# Patient Record
Sex: MALE | Race: WHITE | NOT HISPANIC OR LATINO | Employment: FULL TIME | URBAN - METROPOLITAN AREA
[De-identification: names, ages, dates, MRNs, and addresses within clinical notes are randomized per-mention and may not be internally consistent; named-entity substitution may affect disease eponyms.]

---

## 2017-10-14 ENCOUNTER — APPOINTMENT (OUTPATIENT)
Dept: SOCIAL WORK | Facility: CLINIC | Age: 27
End: 2017-10-14
Payer: COMMERCIAL

## 2017-10-14 PROCEDURE — 90471 IMMUNIZATION ADMIN: CPT | Performed by: REGISTERED NURSE

## 2017-10-14 PROCEDURE — 90686 IIV4 VACC NO PRSV 0.5 ML IM: CPT | Performed by: REGISTERED NURSE

## 2018-02-15 ENCOUNTER — SLEEP CENTER VISIT (OUTPATIENT)
Dept: SLEEP MEDICINE | Facility: MEDICAL CENTER | Age: 28
End: 2018-02-15
Payer: COMMERCIAL

## 2018-02-15 VITALS
SYSTOLIC BLOOD PRESSURE: 132 MMHG | BODY MASS INDEX: 26.34 KG/M2 | TEMPERATURE: 97.8 F | HEIGHT: 70 IN | WEIGHT: 184 LBS | DIASTOLIC BLOOD PRESSURE: 90 MMHG | RESPIRATION RATE: 18 BRPM | HEART RATE: 74 BPM | OXYGEN SATURATION: 96 %

## 2018-02-15 DIAGNOSIS — R06.83 SNORING: ICD-10-CM

## 2018-02-15 DIAGNOSIS — G47.10 HYPERSOMNOLENCE: ICD-10-CM

## 2018-02-15 DIAGNOSIS — G47.30 SLEEP APNEA, UNSPECIFIED TYPE: ICD-10-CM

## 2018-02-15 PROCEDURE — 99244 OFF/OP CNSLTJ NEW/EST MOD 40: CPT | Performed by: INTERNAL MEDICINE

## 2018-02-21 NOTE — PROGRESS NOTES
CC:  Snoring, non-refreshing sleep and excessive daytime somnolence, suspected sleep apnea hypopnea syndrome.    HPI:   Mr. Reyes is a 28-year-old man referred by Dr. Cortes Hi to assist in the evaluation and management of suspected sleep-disordered breathing.    He remembers that he was told by an otolaryngologist at age 10 that he would probably develop sleep apnea as an adult.  For many years he's been told of his snoring and has experienced non-refreshing sleep.  He has a regular sleep schedule, as confirmed by the sleep diary with bedtime at about 10 PM to midnight but may take several hours to fall asleep.  He awakens 3-4 times on average night and arises at 6:30 in the morning on workdays but sleeps until 9 on weekends.  In the morning he often feels tired and groggy without regular morning headaches.  He lives alone and we don't have information on possible cyclic breathing or nocturnal apnea.  He is tired during the day and frequently dozes off while reading or watching television.  His [place] sleepiness score is elevated at 9 points.  He drinks caffeinated beverages moderately and does not use substances to maintain wakefulness.  He has not previously been evaluated for sleep issues.  He does not have symptoms suggesting narcolepsy, parasomnia or restless leg syndrome.  He notes that both of his parents undergone surgery for significant sleep breathing problems.      Past Medical History:   Diagnosis Date   • Back pain    • Bronchitis    • Chickenpox    • Cough    • Depression    • Pneumonia    • Shortness of breath    • Snoring    • Sore throat, chronic        History reviewed. No pertinent surgical history.    Family History   Problem Relation Age of Onset   • No Known Problems Mother    • Sleep Apnea Father      Severe DEJA, had UPPP   • No Known Problems Sister        Social History     Social History   • Marital status: Single     Spouse name: N/A   • Number of children: N/A   • Years of education:  "N/A     Occupational History   • Not on file.     Social History Main Topics   • Smoking status: Never Smoker   • Smokeless tobacco: Never Used      Comment: weekday 2-3 weekend 3-4    • Alcohol use Yes   • Drug use: Yes     Frequency: 7.0 times per week     Types: Marijuana   • Sexual activity: Not on file     Other Topics Concern   • Not on file     Social History Narrative   • No narrative on file       Current Outpatient Prescriptions   Medication Sig Dispense Refill   • Multiple Vitamins-Minerals (MENS MULTIVITAMIN PLUS PO) Take  by mouth.       No current facility-administered medications for this visit.     \"CURRENT RX\"      Allergies: Patient has no known allergies.      ROS  Positive for the sleep issues reviewed above.  He's had no diplopia or visual loss, he has had recurrent pharyngitis with cough but without hoarseness.  She's had no chest pain or palpitations, heartburn or abdominal discomfort.  All other aspects of the CPT review of systems process are negative as outlined in the attached self history form.      Physical Exam:   /90   Pulse 74   Temp 36.6 °C (97.8 °F)   Resp 18   Ht 1.778 m (5' 10\")   Wt 83.5 kg (184 lb)   SpO2 96%   BMI 26.40 kg/m²    Head and neck examination demonstrates no mucosal lesion, purulent drainage or evident polyps. The pharynx is benign with a Mallampati III presentation. The neck is supple without thyromegaly. On chest examination there are symmetrical bilateral breath sounds without rales, wheezing or consolidation. On cardiac examination, the apical impulse and heart sounds are normal and the rhythm is regular. There is no murmur, gallop or rub and no jugular venous distention. The abdomen is soft with active bowel sounds and no palpable hepatosplenomegaly, mass, guarding or rebound. The extremities show no clubbing, cyanosis or edema and no signs of deep venous thrombosis. There is no warmth, redness, tenderness or palpable venous cord in the calves. The " skin is clear, warm and dry. There is no unusual peripheral lymphadenopathy. Peripheral pulses are palpable in all 4 extremities. On neurologic examination, cranial nerve function is intact, motor tone is symmetrical, and the patient is alert, oriented and responsive.       Problems:  1. Sleep apnea, unspecified type  He presents with snoring, non-refreshing sleep and excessive daytime somnolence.  He has a crowded posterior pharyngeal airway.  The clinical probability of sleep apnea hypopnea syndrome is significant. Accurate diagnosis and effective treatment are required not only to improve levels of daytime alertness but also to reduce the cardiac and neurologic risks associated with untreated sleep apnea. We have discussed diagnostic options including in-laboratory, attended polysomnography and home sleep testing. We have also discussed treatment options including airway pressurization, reconstructive otolaryngologic surgery, dental appliances and weight management.    2. Hypersomnolence  Probably related at least in part to the sleep-disordered breathing.  His insomnia and relatively restricted nightly time in bed on week nights may also contribute to hypersomnolence.    3. Snoring      Plan:    1.  Sleep study.  In the interest of expediting his evaluation, we will begin with a home sleep test.  His insomnia may make off again interpretation of a home sleep test and I would suggest a formal polysomnogram if the home sleep test is negative or nondiagnostic given the high clinical probability of obstructive sleep apnea hypopnea syndrome.    2.  Return visit after testing to review test results and treatment options.    We appreciate the opportunity to assist in his care.

## 2018-02-26 ENCOUNTER — HOME STUDY (OUTPATIENT)
Dept: SLEEP MEDICINE | Facility: MEDICAL CENTER | Age: 28
End: 2018-02-26
Attending: INTERNAL MEDICINE
Payer: COMMERCIAL

## 2018-02-26 DIAGNOSIS — R06.83 SNORING: ICD-10-CM

## 2018-02-26 DIAGNOSIS — G47.10 HYPERSOMNOLENCE: ICD-10-CM

## 2018-02-26 DIAGNOSIS — G47.30 SLEEP APNEA, UNSPECIFIED TYPE: ICD-10-CM

## 2018-02-26 PROCEDURE — 95806 SLEEP STUDY UNATT&RESP EFFT: CPT | Performed by: FAMILY MEDICINE

## 2018-02-27 NOTE — PROCEDURES
DIAGNOSTIC HOME SLEEP TEST (HST) REPORT       PATIENT ID:  NAME:  Ryland Reyes  MRN:               [id number]  YOB: 1990  DATE OF STUDY: 2/26/18      Impression:     This study shows evidence of:     1. Moderat obstructive sleep apnea with  Respiratory Event Index (JENELLE) of 15.2 per hour and worse in supine sleep with JENELLE at 20/hr. These findings are based on the recording time (flow evaluation time). It is not possible with this device to determine a traditional apnea+hypopnea index (AHI) for total sleep time since EEG channels are not available.   O2 Sat. destiny was 79% and mean O2 sat was 93% and baseline O2 at 94 %. O2 sat was below 90% for 4% of the flow evaluation time. Oxygen Desaturation (>=3%) Index was elevated at 16/hr.       TECHNICAL DESCRIPTION:  [employer] Device used was a type-III home study device. Home sleep study recording included: Airflow recording by nasal pressure transducer; Respiratory Effort by abdominal Respiratory Bands; O2 by finger oximetry. A position sensor and a snore channel was also used.    Scoring Criteria: A modification of the the AASM Manual for the Scoring of Sleep and Associated Events, 2012, was used.   Obstructive apnea was scored by cessation of airflow for at least 10 seconds with continuing respiratory effort.  Central apnea was scored by cessation of airflow for at least 10 seconds with no effort.  Hypopnea was scored by a 30% or more reduction in airflow for at least 10 seconds accompanied by an arterial oxygen desaturation of 3% or more.  (For Medicare patients, hypopneas were scored by a 30% or more reduction in airflow for at least 10 seconds accompanied by an arterial oxygen saturation of 4% or more, as required by their insurance, CMS.        General sleep summary: . Total recording time is 8 hours and 51 minutes and total flow evaluation time is 7 hours and 26 minutes. The patient spent 4 hours and 39 minutes in the supine position and 2  hours and 28 minutes in the nonsupine position.    Respiratory events:    Apneas: 5 (Obstructive apnea index 0.3/hr, Central apnea index 0.4 /hr, mixed 0 /hour)  Hypopneas: 108    Recommendations:    1. CPAP titration study vs Auto CPAP trial.   2.   In general patients with sleep apnea are advised to avoid alcohol and sedatives and to not operate a motor vehicle while drowsy. In some cases alternative treatment options may prove effective in resolving sleep apnea in these options include upper airway surgery, the use of a dental orthotic or weight loss and positional therapy. Clinical correlation is required.         Please see the attached report for further details. Thank you for your referral.     Leda Cramer MD

## 2018-03-15 ENCOUNTER — SLEEP CENTER VISIT (OUTPATIENT)
Dept: SLEEP MEDICINE | Facility: MEDICAL CENTER | Age: 28
End: 2018-03-15
Payer: COMMERCIAL

## 2018-03-15 VITALS
OXYGEN SATURATION: 95 % | TEMPERATURE: 97 F | RESPIRATION RATE: 16 BRPM | HEART RATE: 92 BPM | WEIGHT: 190 LBS | BODY MASS INDEX: 27.2 KG/M2 | SYSTOLIC BLOOD PRESSURE: 124 MMHG | HEIGHT: 70 IN | DIASTOLIC BLOOD PRESSURE: 78 MMHG

## 2018-03-15 DIAGNOSIS — G47.33 OSA (OBSTRUCTIVE SLEEP APNEA): ICD-10-CM

## 2018-03-15 PROCEDURE — 99213 OFFICE O/P EST LOW 20 MIN: CPT | Performed by: FAMILY MEDICINE

## 2018-03-15 NOTE — PROGRESS NOTES
[facility] Follow Up Note     Date: 3/15/2018 / Time: 2:53 PM    Patient ID:   Name:             Ryland Reyes     YOB: 1990  Age:                 28 y.o.  male   MRN:               [id number]      Thank you for requesting a sleep medicine consultation on Ryland Reyes at the sleep center. He presents today with the chief complaints of DEJA and HST follow up.     HISTORY OF PRESENT ILLNESS:       Pt is currently not on CPAP. He goes to sleep around 10 pm and wakes up around 6-9 am. HST showed Moderat obstructive sleep apnea with  Respiratory Event Index (JENELLE) of 15.2 per hour and worse in supine sleep with JENELLE at 20/hr. O2 Sat. destiny was 79% and mean O2 sat was 93% and baseline O2 at 94 %. O2 sat was below 90% for 4% of the flow evaluation time. Oxygen Desaturation (>=3%) Index was elevated at 16/hr.         REVIEW OF SYSTEMS:       Constitutional: Denies fevers, Denies weight changes  Eyes: Denies changes in vision, no eye pain  Ears/Nose/Throat/Mouth: Denies nasal congestion or sore throat   Cardiovascular: Denies chest pain or palpitations   Respiratory: Denies shortness of breath , Denies cough  Gastrointestinal/Hepatic: Denies abdominal pain, nausea, vomiting, diarrhea, constipation or GI bleeding   Genitourinary: Denies bladder dysfunction, dysuria or frequency  Musculoskeletal/Rheum: Denies  joint pain and swelling   Skin/Breast: Denies rash,   Neurological: Denies headache, confusion, memory loss or focal weakness/parasthesias  Psychiatric: denies mood disorder     Comprehensive review of systems form is reviewed with the patient and is attached in the EMR.     PMH:  has a past medical history of Back pain; Bronchitis; Chickenpox; Cough; Depression; Pneumonia; Shortness of breath; Snoring; and Sore throat, chronic.  MEDS:   Current Outpatient Prescriptions:   •  Multiple Vitamins-Minerals (MENS MULTIVITAMIN PLUS PO), Take  by mouth., Disp: , Rfl:   ALLERGIES: No Known  "Allergies  SURGHX: History reviewed. No pertinent surgical history.  SOCHX:  reports that he has never smoked. He has never used smokeless tobacco. He reports that he drinks alcohol. He reports that he uses drugs, including Marijuana, about 7 times per week..  FH:   Family History   Problem Relation Age of Onset   • No Known Problems Mother    • Sleep Apnea Father      Severe DEJA, had UPPP   • No Known Problems Sister          Physical Exam:  Vitals/ General Appearance:   Weight/BMI: Body mass index is 27.26 kg/m².  Blood pressure 124/78, pulse 92, temperature 36.1 °C (97 °F), resp. rate 16, height 1.778 m (5' 10\"), weight 86.2 kg (190 lb), SpO2 95 %.  Vitals:    03/15/18 1450   BP: 124/78   Pulse: 92   Resp: 16   Temp: 36.1 °C (97 °F)   SpO2: 95%   Weight: 86.2 kg (190 lb)   Height: 1.778 m (5' 10\")       Pt. is alert and oriented to time, place and person. Cooperative and in no apparent distress.       1. Head: Atraumatic, normocephalic.   2. Ears: Normal tympanic membrane and no discharge  3. Nose: No inferior turbinate hypertophy, no septal deviation, no polyp.   4. Throat: Oropharynx appears crowded in that the palate is overhanging ([place] scale 3).   5. Neck: Supple. No thyromegaly  6. Chest: Trachea central, no spine deformity   7. Lungs auscultation: B/L good air entry, vesicular breath sounds, no adventitious sounds  8. Heart auscultation: 1st and 2nd heart sounds normal, regular rhythm. No appreciable murmur.  9. Abdomen: Soft, non tender, no organomegaly. Bowel sounds present  10. Extremities: no clubbing, no pedal edema.  11. Skin: No rash  12. NEUROLOGICAL EXAMINATION: On neurological exam, the patient was alert and oriented x3. speech was clear and fluent without dysarthria.      INVESTIGATIONS:           ASSESSMENT AND PLAN     1.Obstructive Sleep Apnea (DEJA).He  Is currently not on CPAP.       The pathophysiology of DEJA and the increased risk of cardiovascular morbidity from untreated DEJA is " discussed in detail with the patient.      He is urged to avoid supine sleep, weight gain and alcoholic beverages since all of these can worsen DEJA. He is cautioned against drowsy driving. If He feels sleepy while driving, He must pull over for a break/nap, rather than persist on the road, in the interest of He own safety and that of others on the road.   Plan   - Auto CPAP vs overnight CPAP titration vs OAT and surgical procedures. After informed discussion he  Would like to go for UPPP   - HST was reviewed and discussed with the pt   - recommended f/u SS after he has recovered from the surgery    2 Regarding treatment of other past medical problems and general health maintenance,  He is urged to follow up with PCP.

## 2018-06-06 DIAGNOSIS — Z01.812 PRE-OPERATIVE LABORATORY EXAMINATION: ICD-10-CM

## 2018-06-06 LAB
ANION GAP SERPL CALC-SCNC: 10 MMOL/L (ref 0–11.9)
BUN SERPL-MCNC: 23 MG/DL (ref 8–22)
CALCIUM SERPL-MCNC: 9.6 MG/DL (ref 8.5–10.5)
CHLORIDE SERPL-SCNC: 102 MMOL/L (ref 96–112)
CO2 SERPL-SCNC: 25 MMOL/L (ref 20–33)
CREAT SERPL-MCNC: 0.86 MG/DL (ref 0.5–1.4)
ERYTHROCYTE [DISTWIDTH] IN BLOOD BY AUTOMATED COUNT: 37.2 FL (ref 35.9–50)
GLUCOSE SERPL-MCNC: 88 MG/DL (ref 65–99)
HCT VFR BLD AUTO: 52.4 % (ref 42–52)
HGB BLD-MCNC: 18.4 G/DL (ref 14–18)
MCH RBC QN AUTO: 30.8 PG (ref 27–33)
MCHC RBC AUTO-ENTMCNC: 35.1 G/DL (ref 33.7–35.3)
MCV RBC AUTO: 87.6 FL (ref 81.4–97.8)
PLATELET # BLD AUTO: 185 K/UL (ref 164–446)
PMV BLD AUTO: 10.8 FL (ref 9–12.9)
POTASSIUM SERPL-SCNC: 4 MMOL/L (ref 3.6–5.5)
RBC # BLD AUTO: 5.98 M/UL (ref 4.7–6.1)
SODIUM SERPL-SCNC: 137 MMOL/L (ref 135–145)
WBC # BLD AUTO: 8.7 K/UL (ref 4.8–10.8)

## 2018-06-06 PROCEDURE — 85027 COMPLETE CBC AUTOMATED: CPT

## 2018-06-06 PROCEDURE — 80048 BASIC METABOLIC PNL TOTAL CA: CPT

## 2018-06-06 PROCEDURE — 36415 COLL VENOUS BLD VENIPUNCTURE: CPT

## 2018-06-21 ENCOUNTER — HOSPITAL ENCOUNTER (OUTPATIENT)
Facility: MEDICAL CENTER | Age: 28
End: 2018-06-22
Attending: SPECIALIST | Admitting: SPECIALIST
Payer: COMMERCIAL

## 2018-06-21 DIAGNOSIS — J35.1 TONSILLAR HYPERTROPHY: ICD-10-CM

## 2018-06-21 DIAGNOSIS — G89.18 ACUTE POSTOPERATIVE PAIN: ICD-10-CM

## 2018-06-21 DIAGNOSIS — J34.3 HYPERTROPHY OF BOTH INFERIOR NASAL TURBINATES: ICD-10-CM

## 2018-06-21 DIAGNOSIS — J34.2 NASAL SEPTAL DEVIATION: ICD-10-CM

## 2018-06-21 PROCEDURE — 700101 HCHG RX REV CODE 250

## 2018-06-21 PROCEDURE — A9270 NON-COVERED ITEM OR SERVICE: HCPCS | Performed by: SPECIALIST

## 2018-06-21 PROCEDURE — G0378 HOSPITAL OBSERVATION PER HR: HCPCS

## 2018-06-21 PROCEDURE — 501409 HCHG SPLINT, REUTER BI-VALVE NASAL: Performed by: SPECIALIST

## 2018-06-21 PROCEDURE — 110454 HCHG SHELL REV 250: Performed by: SPECIALIST

## 2018-06-21 PROCEDURE — 700105 HCHG RX REV CODE 258: Performed by: SPECIALIST

## 2018-06-21 PROCEDURE — 160002 HCHG RECOVERY MINUTES (STAT): Performed by: SPECIALIST

## 2018-06-21 PROCEDURE — 501838 HCHG SUTURE GENERAL: Performed by: SPECIALIST

## 2018-06-21 PROCEDURE — 700111 HCHG RX REV CODE 636 W/ 250 OVERRIDE (IP): Performed by: SPECIALIST

## 2018-06-21 PROCEDURE — 700111 HCHG RX REV CODE 636 W/ 250 OVERRIDE (IP)

## 2018-06-21 PROCEDURE — 160029 HCHG SURGERY MINUTES - 1ST 30 MINS LEVEL 4: Performed by: SPECIALIST

## 2018-06-21 PROCEDURE — 88304 TISSUE EXAM BY PATHOLOGIST: CPT

## 2018-06-21 PROCEDURE — 160041 HCHG SURGERY MINUTES - EA ADDL 1 MIN LEVEL 4: Performed by: SPECIALIST

## 2018-06-21 PROCEDURE — 500125 HCHG BOVIE, HANDLE: Performed by: SPECIALIST

## 2018-06-21 PROCEDURE — 500331 HCHG COTTONOID, SURG PATTIE: Performed by: SPECIALIST

## 2018-06-21 PROCEDURE — 160009 HCHG ANES TIME/MIN: Performed by: SPECIALIST

## 2018-06-21 PROCEDURE — 700102 HCHG RX REV CODE 250 W/ 637 OVERRIDE(OP)

## 2018-06-21 PROCEDURE — 502573 HCHG PACK, ENT: Performed by: SPECIALIST

## 2018-06-21 PROCEDURE — 700102 HCHG RX REV CODE 250 W/ 637 OVERRIDE(OP): Performed by: SPECIALIST

## 2018-06-21 PROCEDURE — 160048 HCHG OR STATISTICAL LEVEL 1-5: Performed by: SPECIALIST

## 2018-06-21 PROCEDURE — A9270 NON-COVERED ITEM OR SERVICE: HCPCS

## 2018-06-21 PROCEDURE — 501424 HCHG SPONGE, TONSIL: Performed by: SPECIALIST

## 2018-06-21 PROCEDURE — 160035 HCHG PACU - 1ST 60 MINS PHASE I: Performed by: SPECIALIST

## 2018-06-21 PROCEDURE — 96374 THER/PROPH/DIAG INJ IV PUSH: CPT

## 2018-06-21 RX ORDER — LIDOCAINE HYDROCHLORIDE AND EPINEPHRINE 10; 10 MG/ML; UG/ML
INJECTION, SOLUTION INFILTRATION; PERINEURAL
Status: DISCONTINUED | OUTPATIENT
Start: 2018-06-21 | End: 2018-06-21 | Stop reason: HOSPADM

## 2018-06-21 RX ORDER — OXYCODONE HCL 10 MG/1
TABLET, FILM COATED, EXTENDED RELEASE ORAL
Status: COMPLETED
Start: 2018-06-21 | End: 2018-06-21

## 2018-06-21 RX ORDER — OXYCODONE HCL 5 MG/5 ML
SOLUTION, ORAL ORAL
Status: COMPLETED
Start: 2018-06-21 | End: 2018-06-21

## 2018-06-21 RX ORDER — LIDOCAINE HYDROCHLORIDE AND EPINEPHRINE 10; 10 MG/ML; UG/ML
INJECTION, SOLUTION INFILTRATION; PERINEURAL
Status: COMPLETED
Start: 2018-06-21 | End: 2018-06-21

## 2018-06-21 RX ORDER — TIMOLOL MALEATE 5 MG/ML
SOLUTION/ DROPS OPHTHALMIC
Status: COMPLETED
Start: 2018-06-21 | End: 2018-06-21

## 2018-06-21 RX ORDER — SODIUM CHLORIDE, SODIUM LACTATE, POTASSIUM CHLORIDE, CALCIUM CHLORIDE 600; 310; 30; 20 MG/100ML; MG/100ML; MG/100ML; MG/100ML
INJECTION, SOLUTION INTRAVENOUS CONTINUOUS
Status: DISCONTINUED | OUTPATIENT
Start: 2018-06-21 | End: 2018-06-22 | Stop reason: HOSPADM

## 2018-06-21 RX ORDER — ONDANSETRON 2 MG/ML
4 INJECTION INTRAMUSCULAR; INTRAVENOUS EVERY 6 HOURS PRN
Status: DISCONTINUED | OUTPATIENT
Start: 2018-06-21 | End: 2018-06-22 | Stop reason: HOSPADM

## 2018-06-21 RX ORDER — GABAPENTIN 300 MG/1
CAPSULE ORAL
Status: COMPLETED
Start: 2018-06-21 | End: 2018-06-21

## 2018-06-21 RX ORDER — SCOLOPAMINE TRANSDERMAL SYSTEM 1 MG/1
PATCH, EXTENDED RELEASE TRANSDERMAL
Status: COMPLETED
Start: 2018-06-21 | End: 2018-06-21

## 2018-06-21 RX ORDER — ECHINACEA PURPUREA EXTRACT 125 MG
3-4 TABLET ORAL 4 TIMES DAILY
Status: DISCONTINUED | OUTPATIENT
Start: 2018-06-21 | End: 2018-06-22 | Stop reason: HOSPADM

## 2018-06-21 RX ORDER — BUPIVACAINE HYDROCHLORIDE AND EPINEPHRINE 2.5; 5 MG/ML; UG/ML
INJECTION, SOLUTION EPIDURAL; INFILTRATION; INTRACAUDAL; PERINEURAL
Status: DISCONTINUED | OUTPATIENT
Start: 2018-06-21 | End: 2018-06-21 | Stop reason: HOSPADM

## 2018-06-21 RX ORDER — MORPHINE SULFATE 10 MG/ML
5 INJECTION, SOLUTION INTRAMUSCULAR; INTRAVENOUS
Status: DISCONTINUED | OUTPATIENT
Start: 2018-06-21 | End: 2018-06-22 | Stop reason: HOSPADM

## 2018-06-21 RX ORDER — OXYMETAZOLINE HYDROCHLORIDE 0.05 G/100ML
SPRAY NASAL
Status: COMPLETED
Start: 2018-06-21 | End: 2018-06-21

## 2018-06-21 RX ORDER — EPINEPHRINE 1 MG/ML
INJECTION INTRAMUSCULAR; INTRAVENOUS; SUBCUTANEOUS
Status: COMPLETED
Start: 2018-06-21 | End: 2018-06-21

## 2018-06-21 RX ORDER — BUPIVACAINE HYDROCHLORIDE 2.5 MG/ML
INJECTION, SOLUTION EPIDURAL; INFILTRATION; INTRACAUDAL
Status: COMPLETED
Start: 2018-06-21 | End: 2018-06-21

## 2018-06-21 RX ORDER — ACETAMINOPHEN 500 MG
TABLET ORAL
Status: COMPLETED
Start: 2018-06-21 | End: 2018-06-21

## 2018-06-21 RX ORDER — BACITRACIN ZINC 500 [USP'U]/G
OINTMENT TOPICAL
Status: DISCONTINUED | OUTPATIENT
Start: 2018-06-21 | End: 2018-06-21 | Stop reason: HOSPADM

## 2018-06-21 RX ORDER — DEXTROSE, SODIUM CHLORIDE, SODIUM LACTATE, POTASSIUM CHLORIDE, AND CALCIUM CHLORIDE 5; .6; .31; .03; .02 G/100ML; G/100ML; G/100ML; G/100ML; G/100ML
INJECTION, SOLUTION INTRAVENOUS CONTINUOUS
Status: DISCONTINUED | OUTPATIENT
Start: 2018-06-21 | End: 2018-06-22 | Stop reason: HOSPADM

## 2018-06-21 RX ADMIN — OXYCODONE HYDROCHLORIDE 10 MG: 5 SOLUTION ORAL at 11:30

## 2018-06-21 RX ADMIN — HYDROCODONE BITARTRATE AND ACETAMINOPHEN 15 ML: 7.5; 325 SOLUTION ORAL at 15:22

## 2018-06-21 RX ADMIN — FENTANYL CITRATE 50 MCG: 50 INJECTION, SOLUTION INTRAMUSCULAR; INTRAVENOUS at 11:35

## 2018-06-21 RX ADMIN — GABAPENTIN 300 MG: 300 CAPSULE ORAL at 07:10

## 2018-06-21 RX ADMIN — MORPHINE SULFATE 3 MG: 10 INJECTION INTRAVENOUS at 17:28

## 2018-06-21 RX ADMIN — OXYMETAZOLINE HYDROCHLORIDE 2 SPRAY: 5 SPRAY NASAL at 07:15

## 2018-06-21 RX ADMIN — SODIUM CHLORIDE, SODIUM LACTATE, POTASSIUM CHLORIDE, CALCIUM CHLORIDE AND DEXTROSE MONOHYDRATE: 5; 600; 310; 30; 20 INJECTION, SOLUTION INTRAVENOUS at 15:35

## 2018-06-21 RX ADMIN — Medication 3 SPRAY: at 20:29

## 2018-06-21 RX ADMIN — ACETAMINOPHEN 1000 MG: 500 TABLET, FILM COATED ORAL at 07:10

## 2018-06-21 RX ADMIN — OXYCODONE HYDROCHLORIDE 10 MG: 10 TABLET, FILM COATED, EXTENDED RELEASE ORAL at 07:10

## 2018-06-21 RX ADMIN — HYDROCODONE BITARTRATE AND ACETAMINOPHEN 20 ML: 7.5; 325 SOLUTION ORAL at 20:29

## 2018-06-21 ASSESSMENT — LIFESTYLE VARIABLES
DOES PATIENT WANT TO STOP DRINKING: NO
EVER_SMOKED: NEVER
HAVE PEOPLE ANNOYED YOU BY CRITICIZING YOUR DRINKING: YES
CONSUMPTION TOTAL: POSITIVE
HAVE YOU EVER FELT YOU SHOULD CUT DOWN ON YOUR DRINKING: NO
HOW MANY TIMES IN THE PAST YEAR HAVE YOU HAD 5 OR MORE DRINKS IN A DAY: 5
ON A TYPICAL DAY WHEN YOU DRINK ALCOHOL HOW MANY DRINKS DO YOU HAVE: 3
AVERAGE NUMBER OF DAYS PER WEEK YOU HAVE A DRINK CONTAINING ALCOHOL: 3
EVER FELT BAD OR GUILTY ABOUT YOUR DRINKING: NO
TOTAL SCORE: 2
ALCOHOL_USE: YES
EVER HAD A DRINK FIRST THING IN THE MORNING TO STEADY YOUR NERVES TO GET RID OF A HANGOVER: YES

## 2018-06-21 ASSESSMENT — COPD QUESTIONNAIRES
HAVE YOU SMOKED AT LEAST 100 CIGARETTES IN YOUR ENTIRE LIFE: NO/DON'T KNOW
IN THE PAST 12 MONTHS DO YOU DO LESS THAN YOU USED TO BECAUSE OF YOUR BREATHING PROBLEMS: DISAGREE/UNSURE
DO YOU EVER COUGH UP ANY MUCUS OR PHLEGM?: NO/ONLY WITH OCCASIONAL COLDS OR INFECTIONS
DURING THE PAST 4 WEEKS HOW MUCH DID YOU FEEL SHORT OF BREATH: NONE/LITTLE OF THE TIME
COPD SCREENING SCORE: 0

## 2018-06-21 ASSESSMENT — PAIN SCALES - GENERAL
PAINLEVEL_OUTOF10: 4
PAINLEVEL_OUTOF10: 0
PAINLEVEL_OUTOF10: 6
PAINLEVEL_OUTOF10: 0
PAINLEVEL_OUTOF10: 7
PAINLEVEL_OUTOF10: 0
PAINLEVEL_OUTOF10: 4

## 2018-06-21 ASSESSMENT — PATIENT HEALTH QUESTIONNAIRE - PHQ9
SUM OF ALL RESPONSES TO PHQ9 QUESTIONS 1 AND 2: 0
2. FEELING DOWN, DEPRESSED, IRRITABLE, OR HOPELESS: NOT AT ALL
1. LITTLE INTEREST OR PLEASURE IN DOING THINGS: NOT AT ALL

## 2018-06-21 NOTE — OR NURSING
1111 Pt transferred to PACU. Report received from OR RN and anesthesia. Pt responds to commands. Appears to have no distress at this time. VS stable, respirations even and unlabored. No complaints of pain or nausea.    1130 Pt medicated for pain per MAR. Tolerating sips of water without complaints of nausea.     1245 Report given to [facility], RN. Waiting for room to be cleaned.     1315 Pt placed on transport. Mother at bedside.     1330 Handoff to CORA Gordillo.     1345 Pt on transport to [facility]. Pt has all belongings. Family updated.

## 2018-06-21 NOTE — OR SURGEON
Immediate Post OP Note    PreOp Diagnosis: DEJA, NSD, turbinate hypertrophy, tonsillar hypertrophy    PostOp Diagnosis: same, chronic tonsillitis    Procedure(s):  SEPTOPLASTY - Wound Class: Clean Contaminated  SMR turbinates, bilateral - Wound Class: Clean Contaminated  UVULOPHARYNGOPALATOPLASTY - Wound Class: Clean Contaminated    Surgeon(s):  Cortes Hi M.D.    Anesthesiologist/Type of Anesthesia:  Anesthesiologist: Kesha Lira M.D./General    Surgical Staff:  Circulator: Akila Welch R.N.  Relief Scrub: Stephanie Paiz  Scrub Person: Simone Walton    Specimens removed if any:  Tonsils, uvula    Estimated Blood Loss: 50 ml    Findings:     Complications: none        6/21/2018 11:16 AM Cortes Hi M.D.

## 2018-06-21 NOTE — OP REPORT
DATE OF SERVICE:  06/21/2018    SURGEON:  Cortes Hi MD    ASSISTANT:  None.    ANESTHESIA:  General given by endotracheal tube by Dr. Lira.    PREOPERATIVE DIAGNOSES:  Obstructive sleep apnea, septal deformity, turbinate   hypertrophy, and tonsillar hypertrophy.    POSTOPERATIVE DIAGNOSES:  Obstructive sleep apnea, septal deformity, turbinate   hypertrophy, tonsillar hypertrophy, and chronic tonsillitis.    NAME OF THE PROCEDURE:  Nasal septoplasty; uvulopalatopharyngoplasty with   associated tonsillectomy; bilateral submucous resection of inferior   turbinates.    INDICATIONS:  The patient is a 28-year-old man with a history of obstructive   sleep apnea documented by polysomnography.  Septal deformity, turbinate   hypertrophy, and tonsillar hypertrophy have been noted on examination.  He   presents now for surgical intervention on elective basis.    DESCRIPTION OF PROCEDURE:  Patient was brought in the operating room, and   placed in supine position on the operating room table.  General anesthesia is   induced and the patient is endotracheally intubated without difficulty.  Eyes   are protected with taping and table was turned 90 degrees.    Approximately 4 mL of 4% cocaine solution applied topically in cottonoid   pledgets to both sides of the nose.  Vibrissae were trimmed.  After initial   vasoconstriction, the cottonoid pledgets were removed.  Approximately 14 mL of   1% Xylocaine with 1:100,000 units of epinephrine solution were used to   infiltrate both sides of the nasal septum as well as the inferior turbinate   areas.  Cottonoid pledgets were then repositioned in the nose.    A shoulder roll was placed beneath the shoulders to extend the neck.  Head   drapes were placed about the head.  A McIvor mouth gag was introduced into the   oral cavity.    Upon opening the retractor, the patient was noted to have 2+ size cryptic   appearing tonsils with cryptic debris present bilaterally.  No cleft or    submucous cleft to the palate is noted.    Attention was first directed to the left tonsil where an intratonsillar pillar   incision was made followed by identification of the tonsillar capsule and   dissection along the capsule from the superior to inferior direction utilizing   a gold laser at a power setting of 14 hallman continuous.  Tonsils delivered by   dissection means alone.  Bleeding was controlled with spot cauterization,   bleeding points with the laser in a deep focus fashion both during dissection   as well as following removal of the tonsil.  Once bleeding was controlled, the   mouth gag was released for a minute and reopened.    Attention was then directed to the right tonsil.  In a similar fashion,   anterior tonsillar pillar incision was made followed by identification of   tonsillar capsule and dissection along the capsule from the superior to   inferior direction utilizing a gold laser at a power setting of 14 hallman   continuous.  Cryptic debris was noted to extrude from the tonsil during   dissection.  Tonsils delivered by dissection means alone.  Bleeding was   controlled by spot cauterization, bleeding points with the laser in a deep   focus fashion both during dissection as well as following removal of the   tonsil.    The mouth gag was released for a minute and reopened.  Attention was then   directed to the palate.  The uvula was excised in its base in a quadrilateral   fashion preserving the dorsal mucosa and tissue of this palate.  Following   excision with a laser, the ventral portion of the palate V shape defect was   then approximated using a 3-0 Vicryl in a horizontal mattress fashion.  A 3-0   Vicryl was then used in a vertical mattress fashion to approximate the   anterior and posterior tonsillar pillars along the superior aspect of the   tonsillar fossa bilaterally, in an obliquely oriented direction to widen the   nasopharyngeal port.  Once sutures were placed on either side, the  anterior   and posterior tonsillar pillars were then approximated along the mid fossa   areas on both sides again with a vertical mattress stitch.  Significant   bleeding in the pharynx was not apparent.  Approximately 4 mL of 0.25%   Marcaine solution with epinephrine was then used to infiltrate along the area   of the uvulopalatopharyngoplasty.  The mouth gag was released.    Attention was then directed to the nose.  The nasal septum was noted to have a   deflection to the left superiorly as well as posteriorly with a prominent   spur into the middle meatal area posteriorly on the left.  A left   hemitransfixion incision is made followed by elevation of anterior and   posterior tunnels in the submucoperichondrial and submucoperiosteal planes on   the left side of the septum.  Care was taken with elevation over the area of   the spur formation.  The bony cartilaginous junction was identified   inferiorly, but there was noted to be a calcification and bony change along   the superior aspect of the quadrilateral cartilage suggestive of prior trauma.    A posterior tunnel was then created on the right hand side.  Portions of the   vomeric bone contributing to the spur formation posteriorly were removed   using [place] and Abad forceps.  Portions of the thickened and   deflecting perpendicular plate of the ethmoid were also removed again using   the [place] forceps and Abad forceps.  An area of calcification   along the superior aspect of the quadrilateral cartilage was carefully divided   with a freer followed by elevation on the right hand aspect to allow for   removal of some of the thickened and calcified cartilage from this area.  A   dorsal strut of cartilage is preserved, however.  Portions of the vomeric bone   and perpendicular plate of the ethmoid bone were then placed in a bone    and flattened and straightened.  Portions of the crushed bone were   then repositioned into  the posterior septum and the area of the perpendicular   plate of the ethmoid.  The calcified cartilage from the superior aspect of the   quadrilateral cartilage was also crushed and being repositioned into the   superior septal area.  The septum is noted to fall more into the midline with   improved airway bilaterally.  The hemitransfixion incision was closed using a   running lock stitch of 4-0 chromic.  A 4-0 chromic was also used in a running   horizontal mattress type quilting fashion to repose mucosal flaps.    Attention then directed to the left inferior turbinate.  The anterior leading   edge was first cauterized and then incised.  Mucosa and submucosa were   elevated from the medial and lateral aspects of the anterior portion of the   inferior turbinate bone.  Small portions of the inferior turbinate bone were   then resected in a piecemeal fashion with Abad forceps.  The remaining   submucosal tissues intramurally cauterized with a suction cautery and the   remaining turbinate was then outfractured.    Attention was then directed to the right inferior turbinate.  In a similar   fashion, anterior leading edge of the inferior turbinate was first cauterized   and then incised.  Mucosa and submucosa were elevated from the medial and   lateral aspects of the anterior portion of the inferior turbinate bone.  Small   portions of the inferior turbinate bone were then resected in a piecemeal   fashion with Abad forceps.  The remaining submucosal tissue was   intramurally cauterized.  The remaining turbinate was outfractured.    Dominique bivalved intranasal splints were placed bilaterally along the nasal   septum and secured with 3-0 silk suture.  Surgiflo was applied to the inferior   meatus area in the region of the SMR turbinates.    Attention then directed to the pharynx.  Further significant bleeding in the   pharynx was not apparent.  Stomach contents were aspirated.  The mouth gag was   then removed.   The patient was subsequently awakened from anesthesia and   extubated without difficulty.  He was taken from the operating room to the   recovery area, awake, breathing on his own in stable condition.  There are no   apparent complications.  Blood loss was felt to be approximately 50 mL.       ____________________________________     MD LORA EPPS / VINH    DD:  06/21/2018 11:27:03  DT:  06/21/2018 11:58:48    D#:  [id number]  Job#:  [id number]

## 2018-06-22 VITALS
RESPIRATION RATE: 16 BRPM | WEIGHT: 184.08 LBS | DIASTOLIC BLOOD PRESSURE: 76 MMHG | SYSTOLIC BLOOD PRESSURE: 124 MMHG | HEIGHT: 70 IN | TEMPERATURE: 97.4 F | OXYGEN SATURATION: 94 % | HEART RATE: 85 BPM | BODY MASS INDEX: 26.35 KG/M2

## 2018-06-22 PROCEDURE — 700105 HCHG RX REV CODE 258: Performed by: SPECIALIST

## 2018-06-22 PROCEDURE — 700111 HCHG RX REV CODE 636 W/ 250 OVERRIDE (IP): Performed by: SPECIALIST

## 2018-06-22 PROCEDURE — G0378 HOSPITAL OBSERVATION PER HR: HCPCS

## 2018-06-22 PROCEDURE — 96376 TX/PRO/DX INJ SAME DRUG ADON: CPT

## 2018-06-22 PROCEDURE — A9270 NON-COVERED ITEM OR SERVICE: HCPCS | Performed by: SPECIALIST

## 2018-06-22 PROCEDURE — 700102 HCHG RX REV CODE 250 W/ 637 OVERRIDE(OP): Performed by: SPECIALIST

## 2018-06-22 RX ORDER — HYDROCODONE BITARTRATE AND ACETAMINOPHEN 2.5; 108 MG/5ML; MG/5ML
15 SOLUTION ORAL EVERY 6 HOURS PRN
Qty: 480 ML | Refills: 0 | Status: SHIPPED | OUTPATIENT
Start: 2018-06-22 | End: 2018-06-29

## 2018-06-22 RX ORDER — CEFDINIR 250 MG/5ML
250 POWDER, FOR SUSPENSION ORAL 2 TIMES DAILY
Qty: 50 ML | Refills: 0 | Status: SHIPPED | OUTPATIENT
Start: 2018-06-22 | End: 2018-06-27

## 2018-06-22 RX ADMIN — SODIUM CHLORIDE, SODIUM LACTATE, POTASSIUM CHLORIDE, CALCIUM CHLORIDE AND DEXTROSE MONOHYDRATE: 5; 600; 310; 30; 20 INJECTION, SOLUTION INTRAVENOUS at 03:34

## 2018-06-22 RX ADMIN — MORPHINE SULFATE 5 MG: 10 INJECTION INTRAVENOUS at 09:02

## 2018-06-22 RX ADMIN — HYDROCODONE BITARTRATE AND ACETAMINOPHEN 20 ML: 7.5; 325 SOLUTION ORAL at 01:32

## 2018-06-22 RX ADMIN — HYDROCODONE BITARTRATE AND ACETAMINOPHEN 20 ML: 7.5; 325 SOLUTION ORAL at 06:45

## 2018-06-22 RX ADMIN — Medication 4 SPRAY: at 01:59

## 2018-06-22 RX ADMIN — Medication 4 SPRAY: at 08:16

## 2018-06-22 ASSESSMENT — PAIN SCALES - GENERAL
PAINLEVEL_OUTOF10: 5
PAINLEVEL_OUTOF10: 5
PAINLEVEL_OUTOF10: 7

## 2018-06-22 NOTE — CARE PLAN
Problem: Safety  Goal: Will remain free from falls  Outcome: PROGRESSING AS EXPECTED  Pt remains free from fall at this time.  Pt educated on fall risk.  Pt demonstrates understanding by appropriate use of call light to call for assistance.  CLIP, hourly rounding in place.     Problem: Venous Thromboembolism (VTW)/Deep Vein Thrombosis (DVT) Prevention:  Goal: Patient will participate in Venous Thrombosis (VTE)/Deep Vein Thrombosis (DVT)Prevention Measures  Outcome: PROGRESSING AS EXPECTED   06/21/18 2100   Mechanical/VTE Prophylaxis   Mechanical Prophylaxis  SCDs, Sequential Compression Device   SCDs, Sequential Compression Device Refused   OTHER   Risk Assessment Score 2   VTE RISK Moderate

## 2018-06-22 NOTE — PROGRESS NOTES
Report received from PACU RN.  Patient arrived to floor via gurney.   Patient able to stand with standby assistance.    Educated on admission including: unit policies, welcome packet, white board, TV system, call light, call before fall, plan of care, how to report/escalate concerns, VS schedule,lab schedule, oral care expectations, ambulation expectations, and up to chair for all meals expectation if possible.    Call light and belongings within reach.  All questions answered.

## 2018-06-22 NOTE — PROGRESS NOTES
Pt requested morphine for pain at this time. Throat examined and no bleeding noted. Nares with old blood and bright red blood after saline nose spray. HOB is up and tolerated solid diet and liquids easily.

## 2018-06-22 NOTE — DISCHARGE INSTRUCTIONS
Discharge Instructions    Discharged to home by car with relative. Discharged via wheelchair, hospital escort: Yes.  Special equipment needed: Not Applicable    Be sure to schedule a follow-up appointment with your primary care doctor or any specialists as instructed.     Discharge Plan:   Influenza Vaccine Indication: Not indicated: Previously immunized this influenza season and > 8 years of age    I understand that a diet low in cholesterol, fat, and sodium is recommended for good health. Unless I have been given specific instructions below for another diet, I accept this instruction as my diet prescription.   Other diet: As tolerated    Special Instructions: None    · Is patient discharged on Warfarin / Coumadin?   No   Septoplasty, Care After  Refer to this sheet in the next few weeks. These instructions provide you with information about caring for yourself after your procedure. Your health care provider may also give you more specific instructions. Your treatment has been planned according to current medical practices, but problems sometimes occur. Call your health care provider if you have any problems or questions after your procedure.  WHAT TO EXPECT AFTER THE PROCEDURE  After your procedure, it is typical to have the following:  · Mild headache.  · Stuffy nose.  · Feeling of fullness in your ears.  · Bloody fluid coming from your nose.  HOME CARE INSTRUCTIONS  Medicines  · If you were prescribed an antibiotic medicine, finish it all even if you start to feel better.  · Take medicines only as directed by your health care provider.  · You may be directed to clean your nostrils with a saline nasal spray. This will help to clear the crusts and blood clots in your nose. The solution can be found as an over-the-counter solution or made at home as directed by your health care provider.  · You may need to take laxatives or stool softeners as directed by your health care provider.  What to Avoid  · Do not blow your  nose for 2 weeks after surgery or as directed by your health care provider.  · Do not lift anything heavier than 10 lb (4.5 kg) for 2 weeks or as directed by your health care provider.  · Avoid strenuous activities that can cause nosebleeds for 2 weeks. These include activities such as running or playing sports.  · Avoid very hot or steamy showers for several days after surgery or as directed by your health care provider.  Eating and Drinking  · Avoid eating hot and spicy foods for several days after surgery or as directed by your health care provider.  · Eat plenty of fiber to keep your stools soft, especially while you are taking pain medicine. This helps you to avoid straining, which can cause a nosebleed.  · Drink enough fluid to keep your urine clear or pale yellow.  General Instructions  · Raise (elevate) your head while you are lying down.  · Keep all follow-up visits as directed by your health care provider. This is important.  · If you have nasal splints, follow your health care provider's instructions about removal.  · If your nose was packed with gauze, follow your health care provider's instructions about removal.  SEEK MEDICAL CARE IF:  · You develop swelling or increased pain in your nose.  · You have yellowish-white fluid (pus) coming from your nose.  · You have severe diarrhea.  · You have ongoing (persistent) nausea.  · You cannot breathe through your nose.  · You have a fever.  SEEK IMMEDIATE MEDICAL CARE IF:  · You are short of breath.  · You feel dizzy or you faint.  · You have vision changes.  · You are bleeding heavily from your nose.  · You are vomiting.  · You have a severe headache or a stiff neck.     This information is not intended to replace advice given to you by your health care provider. Make sure you discuss any questions you have with your health care provider.     Document Released: 12/18/2006 Document Revised: 01/08/2016 Document Reviewed: 07/29/2015  STYLIGHT Patient  Education ©2016 [employer]      Tonsillectomy, Adult, Care After  Refer to this sheet in the next few weeks. These instructions provide you with information on caring for yourself after your procedure. Your health care provider may also give you more specific instructions. Your treatment has been planned according to current medical practices, but problems sometimes occur. Call your health care provider if you have any problems or questions after your procedure.  WHAT TO EXPECT AFTER THE PROCEDURE  After your procedure, it is typical to have the following:  · Your tongue will be numb, and your sense of taste will be reduced.  · Your swallowing will be difficult and painful.  · Your jaw may hurt or make a clicking noise when you yawn or chew.  · Liquids that you drink may leak out of your nose.  · Your voice may sound muffled.  · The area at the middle of the roof of your mouth (uvula) may be very swollen.  · You may have a constant cough and need to clear mucus and phlegm from your throat.  HOME CARE INSTRUCTIONS   · Get proper rest, keeping your head elevated at all times.  · Drink plenty of fluids. This reduces pain and hastens the healing process.  · Take medicines only as directed by your health care provider.  · Soft and cold foods, such as gelatin, sherbet, ice cream, frozen ice pops, and cold drinks, are usually the easiest to eat. Several days after surgery, you will be able to eat more solid food.  · Avoid mouthwashes and gargles.  · Avoid contact with people who have upper respiratory infections, such as colds and sore throats.  SEEK MEDICAL CARE IF:   · You have increasing pain that is not controlled with medicines.  · You have a fever.  · You have a rash.  · You feel light-headed or faint.  · You are unable to swallow even small amounts of liquid or saliva.  · Your urine is becoming very dark.  SEEK IMMEDIATE MEDICAL CARE IF:   · You have difficulty breathing.  · You experience side effects or  allergic reactions to medicines.  · You bleed bright red blood from your throat, or you vomit bright red blood.     This information is not intended to replace advice given to you by your health care provider. Make sure you discuss any questions you have with your health care provider.     Document Released: 10/18/2005 Document Revised: 05/03/2016 Document Reviewed: 07/15/2014  [employer] Interactive Patient Education ©2016 [employer]    Depression / Suicide Risk    As you are discharged from this [facility] facility, it is important to learn how to keep safe from harming yourself.    Recognize the warning signs:  · Abrupt changes in personality, positive or negative- including increase in energy   · Giving away possessions  · Change in eating patterns- significant weight changes-  positive or negative  · Change in sleeping patterns- unable to sleep or sleeping all the time   · Unwillingness or inability to communicate  · Depression  · Unusual sadness, discouragement and loneliness  · Talk of wanting to die  · Neglect of personal appearance   · Rebelliousness- reckless behavior  · Withdrawal from people/activities they love  · Confusion- inability to concentrate     If you or a loved one observes any of these behaviors or has concerns about self-harm, here's what you can do:  · Talk about it- your feelings and reasons for harming yourself  · Remove any means that you might use to hurt yourself (examples: pills, rope, extension cords, firearm)  · Get professional help from the community (Mental Health, Substance Abuse, psychological counseling)  · Do not be alone:Call your Safe Contact- someone whom you trust who will be there for you.  · Call your local CRISIS HOTLINE 734-5867 or 247-764-4504  · Call your local Children's Mobile Crisis Response Team [place] (509) 840-8868 or www.[employer]  · Call the toll free National Suicide Prevention Hotlines   · National Suicide Prevention Lifeline 493-289-VBTR  (3936)  · [facility] 800-SUICIDE (198-2226)

## 2018-06-22 NOTE — PROGRESS NOTES
Assumed care of Mr Reyes at 1900.    Pt is A&O x4.  Pain 7/10.  Pt medicated per MAR  Nausea denied  Tolerating Diet   Nasal dressing in place, small amount of sanguineous drainage.  Positive Urine output  Positive BM Void PTA   Positive Flatus  Up Self   SCD's refused  Bed in lowest position and locked.    ** Bed alarm not indicated per Deyanira Lazaro assessment.  CLIP, hourly rounding in place    Pt resting comfortably now.  Review plan of care with patient  Call light within reach  Hourly rounds in place  All needs met at this time

## 2018-09-24 ENCOUNTER — HOSPITAL ENCOUNTER (OUTPATIENT)
Dept: LAB | Facility: MEDICAL CENTER | Age: 28
End: 2018-09-24
Attending: NURSE PRACTITIONER
Payer: COMMERCIAL

## 2018-09-24 LAB
ALBUMIN SERPL BCP-MCNC: 4.4 G/DL (ref 3.2–4.9)
ALBUMIN/GLOB SERPL: 1.6 G/DL
ALP SERPL-CCNC: 70 U/L (ref 30–99)
ALT SERPL-CCNC: 25 U/L (ref 2–50)
ANION GAP SERPL CALC-SCNC: 6 MMOL/L (ref 0–11.9)
AST SERPL-CCNC: 20 U/L (ref 12–45)
BASOPHILS # BLD AUTO: 0.9 % (ref 0–1.8)
BASOPHILS # BLD: 0.06 K/UL (ref 0–0.12)
BILIRUB SERPL-MCNC: 0.5 MG/DL (ref 0.1–1.5)
BUN SERPL-MCNC: 18 MG/DL (ref 8–22)
CALCIUM SERPL-MCNC: 9.7 MG/DL (ref 8.5–10.5)
CHLORIDE SERPL-SCNC: 104 MMOL/L (ref 96–112)
CHOLEST SERPL-MCNC: 154 MG/DL (ref 100–199)
CO2 SERPL-SCNC: 29 MMOL/L (ref 20–33)
CREAT SERPL-MCNC: 1.19 MG/DL (ref 0.5–1.4)
EOSINOPHIL # BLD AUTO: 0.11 K/UL (ref 0–0.51)
EOSINOPHIL NFR BLD: 1.6 % (ref 0–6.9)
ERYTHROCYTE [DISTWIDTH] IN BLOOD BY AUTOMATED COUNT: 42.9 FL (ref 35.9–50)
FASTING STATUS PATIENT QL REPORTED: NORMAL
GLOBULIN SER CALC-MCNC: 2.8 G/DL (ref 1.9–3.5)
GLUCOSE SERPL-MCNC: 93 MG/DL (ref 65–99)
HCT VFR BLD AUTO: 56.6 % (ref 42–52)
HDLC SERPL-MCNC: 40 MG/DL
HGB BLD-MCNC: 19.1 G/DL (ref 14–18)
IMM GRANULOCYTES # BLD AUTO: 0.01 K/UL (ref 0–0.11)
IMM GRANULOCYTES NFR BLD AUTO: 0.1 % (ref 0–0.9)
LDLC SERPL CALC-MCNC: 102 MG/DL
LYMPHOCYTES # BLD AUTO: 2 K/UL (ref 1–4.8)
LYMPHOCYTES NFR BLD: 29.4 % (ref 22–41)
MCH RBC QN AUTO: 32.2 PG (ref 27–33)
MCHC RBC AUTO-ENTMCNC: 33.7 G/DL (ref 33.7–35.3)
MCV RBC AUTO: 95.4 FL (ref 81.4–97.8)
MONOCYTES # BLD AUTO: 0.52 K/UL (ref 0–0.85)
MONOCYTES NFR BLD AUTO: 7.6 % (ref 0–13.4)
NEUTROPHILS # BLD AUTO: 4.1 K/UL (ref 1.82–7.42)
NEUTROPHILS NFR BLD: 60.4 % (ref 44–72)
NRBC # BLD AUTO: 0 K/UL
NRBC BLD-RTO: 0 /100 WBC
PLATELET # BLD AUTO: 235 K/UL (ref 164–446)
PMV BLD AUTO: 11 FL (ref 9–12.9)
POTASSIUM SERPL-SCNC: 4.6 MMOL/L (ref 3.6–5.5)
PROT SERPL-MCNC: 7.2 G/DL (ref 6–8.2)
RBC # BLD AUTO: 5.93 M/UL (ref 4.7–6.1)
SODIUM SERPL-SCNC: 139 MMOL/L (ref 135–145)
T4 FREE SERPL-MCNC: 0.83 NG/DL (ref 0.53–1.43)
TRIGL SERPL-MCNC: 61 MG/DL (ref 0–149)
TSH SERPL DL<=0.005 MIU/L-ACNC: 2 UIU/ML (ref 0.38–5.33)
WBC # BLD AUTO: 6.8 K/UL (ref 4.8–10.8)

## 2018-09-24 PROCEDURE — 84403 ASSAY OF TOTAL TESTOSTERONE: CPT

## 2018-09-24 PROCEDURE — 80061 LIPID PANEL: CPT

## 2018-09-24 PROCEDURE — 36415 COLL VENOUS BLD VENIPUNCTURE: CPT

## 2018-09-24 PROCEDURE — 84439 ASSAY OF FREE THYROXINE: CPT

## 2018-09-24 PROCEDURE — 80053 COMPREHEN METABOLIC PANEL: CPT

## 2018-09-24 PROCEDURE — 84443 ASSAY THYROID STIM HORMONE: CPT

## 2018-09-24 PROCEDURE — 84270 ASSAY OF SEX HORMONE GLOBUL: CPT

## 2018-09-24 PROCEDURE — 85025 COMPLETE CBC W/AUTO DIFF WBC: CPT

## 2018-09-25 LAB
SHBG SERPL-SCNC: 21 NMOL/L (ref 11–80)
TESTOST FREE MFR SERPL: ABNORMAL % (ref 1.6–2.9)
TESTOST FREE SERPL-MCNC: ABNORMAL PG/ML (ref 47–244)
TESTOST SERPL-MCNC: >1500 NG/DL (ref 300–1080)

## 2018-10-20 ENCOUNTER — HOSPITAL ENCOUNTER (OUTPATIENT)
Dept: LAB | Facility: MEDICAL CENTER | Age: 28
End: 2018-10-20
Attending: NURSE PRACTITIONER
Payer: COMMERCIAL

## 2018-10-20 LAB
BASOPHILS # BLD AUTO: 0.6 % (ref 0–1.8)
BASOPHILS # BLD: 0.04 K/UL (ref 0–0.12)
EOSINOPHIL # BLD AUTO: 0.14 K/UL (ref 0–0.51)
EOSINOPHIL NFR BLD: 2.2 % (ref 0–6.9)
ERYTHROCYTE [DISTWIDTH] IN BLOOD BY AUTOMATED COUNT: 39.1 FL (ref 35.9–50)
FERRITIN SERPL-MCNC: 40.7 NG/ML (ref 22–322)
HCT VFR BLD AUTO: 49.9 % (ref 42–52)
HGB BLD-MCNC: 17.2 G/DL (ref 14–18)
IMM GRANULOCYTES # BLD AUTO: 0.01 K/UL (ref 0–0.11)
IMM GRANULOCYTES NFR BLD AUTO: 0.2 % (ref 0–0.9)
IRON SATN MFR SERPL: 18 % (ref 15–55)
IRON SERPL-MCNC: 77 UG/DL (ref 50–180)
LYMPHOCYTES # BLD AUTO: 2.02 K/UL (ref 1–4.8)
LYMPHOCYTES NFR BLD: 31.1 % (ref 22–41)
MCH RBC QN AUTO: 31.6 PG (ref 27–33)
MCHC RBC AUTO-ENTMCNC: 34.5 G/DL (ref 33.7–35.3)
MCV RBC AUTO: 91.6 FL (ref 81.4–97.8)
MONOCYTES # BLD AUTO: 0.42 K/UL (ref 0–0.85)
MONOCYTES NFR BLD AUTO: 6.5 % (ref 0–13.4)
NEUTROPHILS # BLD AUTO: 3.87 K/UL (ref 1.82–7.42)
NEUTROPHILS NFR BLD: 59.4 % (ref 44–72)
NRBC # BLD AUTO: 0 K/UL
NRBC BLD-RTO: 0 /100 WBC
PLATELET # BLD AUTO: 206 K/UL (ref 164–446)
PMV BLD AUTO: 11.2 FL (ref 9–12.9)
RBC # BLD AUTO: 5.45 M/UL (ref 4.7–6.1)
TIBC SERPL-MCNC: 421 UG/DL (ref 250–450)
WBC # BLD AUTO: 6.5 K/UL (ref 4.8–10.8)

## 2018-10-20 PROCEDURE — 36415 COLL VENOUS BLD VENIPUNCTURE: CPT

## 2018-10-20 PROCEDURE — 83540 ASSAY OF IRON: CPT

## 2018-10-20 PROCEDURE — 85025 COMPLETE CBC W/AUTO DIFF WBC: CPT

## 2018-10-20 PROCEDURE — 82728 ASSAY OF FERRITIN: CPT

## 2018-10-20 PROCEDURE — 83550 IRON BINDING TEST: CPT

## 2018-10-25 ENCOUNTER — IMMUNIZATION (OUTPATIENT)
Dept: SOCIAL WORK | Facility: CLINIC | Age: 28
End: 2018-10-25
Payer: COMMERCIAL

## 2018-10-25 DIAGNOSIS — Z23 NEED FOR VACCINATION: ICD-10-CM

## 2018-10-25 PROCEDURE — 90471 IMMUNIZATION ADMIN: CPT | Performed by: REGISTERED NURSE

## 2018-10-25 PROCEDURE — 90686 IIV4 VACC NO PRSV 0.5 ML IM: CPT | Performed by: REGISTERED NURSE

## 2019-03-05 ENCOUNTER — OFFICE VISIT (OUTPATIENT)
Dept: ENDOCRINOLOGY | Facility: MEDICAL CENTER | Age: 29
End: 2019-03-05
Payer: COMMERCIAL

## 2019-03-05 VITALS
OXYGEN SATURATION: 94 % | HEIGHT: 70 IN | SYSTOLIC BLOOD PRESSURE: 124 MMHG | DIASTOLIC BLOOD PRESSURE: 82 MMHG | WEIGHT: 195 LBS | BODY MASS INDEX: 27.92 KG/M2 | HEART RATE: 100 BPM

## 2019-03-05 DIAGNOSIS — E34.9 TESTOSTERONE DEFICIENCY: ICD-10-CM

## 2019-03-05 DIAGNOSIS — G47.33 OSA (OBSTRUCTIVE SLEEP APNEA): ICD-10-CM

## 2019-03-05 DIAGNOSIS — D75.1 POLYCYTHEMIA: Primary | ICD-10-CM

## 2019-03-05 PROCEDURE — 99245 OFF/OP CONSLTJ NEW/EST HI 55: CPT | Performed by: INTERNAL MEDICINE

## 2019-03-05 RX ORDER — BUSPIRONE HYDROCHLORIDE 10 MG/1
TABLET ORAL
COMMUNITY
Start: 2019-02-05

## 2019-03-05 NOTE — PROGRESS NOTES
Chief Complaint   Patient presents with   • Hypogonadism      ?  Testosterone deficiency ?            CHIEF COMPLAINT:      Endocrine evaluation requested by MASHA Lofton for this 29 year old gentleman who has questions about testosterone supplementation.     HPI  This is an interesting situation.  He is a single, very athletic young man who maintains good physical condition and visits the gym regularly.  He is not a compulsive .  He has been an athlete in the past and had tryouts for the minor leagues in baseball but decided that was not his future.  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  He does not have a steady girlfriend or significant other.  He does enjoy his work and does see a future in that particular field.     However over the past year, he just noticed that he lacked interest in what he was doing.  He perhaps had a diminished libido.  He did not engage his gym workouts with the same enthusiasm that he ordinarily would.  There was an acquaintance of him, probably through his gym workout that was using testosterone.  Through that source he was able to get testosterone and give himself testosterone injections about twice a week.  He did that for about a month and felt exceptionally better.  He was quite pleased with the outcome.  However he does have sleep apnea and had sleep apnea surgery on his nasopharynx last September.  He had to discontinue the testosterone to have that accomplished.  Around that time, he consulted Whitley who did a testosterone level and found it greater than 1500.  Also she found polycythemia.  September 24, hemoglobin was 19.1 and hematocrit 56.  He was advised to stop using the testosterone and he did a phlebotomy.  Following his nasopharyngeal surgery he was feeling decidedly better but still in sort of a slump as before.      His growth and development in his early years was normal although he was somewhat of a late shar.  Went  into high school small but got out of high school having gone through puberty very well developed.  At no time did he develop gynecomastia or tender breasts before using testosterone or even during.      Now the question is does he have a testosterone problem or not and might he benefit from taking a testosterone supplement.  He has had no testosterone for at least three months.      The plan is to get a new baseline testosterone, free testosterone level, LH, prolactin, growth hormone and we will also repeat his CBC to make sure he is not getting polycythemic again.    I will speak with him again after we see our new baseline levels.        ROS:  Constitutional   feels generally healthy  Eyes   vision stable  ENT    no pain or epistaxis, no unusual congestion  Cardiac   no angina, no arrhythmia  Respiratory   no hemoptysis, no unusual dyspnea, no cough  GI    no pain, indigestion, or unexpected bowel change     no voiding symptoms  Musculoskeletal    no unusual or new pains  Skin   no suspicious or concerning lesions  Neuro  no unusual weakness or loss of function  Psych   appears alert and appropriate  Lymph   no new or unusual lumps or nodules      Allergies: No Known Allergies    Current medicines including changes today:  Current Outpatient Prescriptions   Medication Sig Dispense Refill   • busPIRone (BUSPAR) 10 MG Tab tablet        No current facility-administered medications for this visit.         Past Medical History:   Diagnosis Date   • Bronchitis 2017   • Cough    • Pneumonia 2008   • Shortness of breath    • Sleep apnea    • Snoring      Family history            No family history of hormonal problems.  Parents are healthy as is a sister.      Social history           Patient is single.  He works as a  in the [employer] industry.           He does not smoke cigarettes.  He drinks alcohol mildly 1 or 2 drinks in an evening home.  He does not frequent bars.  He uses marijuana for sleep    PHYSICAL  "EXAM:    /82 (BP Location: Right arm, Patient Position: Sitting)   Pulse 100   Ht 1.778 m (5' 10\")   Wt 88.5 kg (195 lb)   SpO2 94%   BMI 27.98 kg/m²     Gen.   appears healthy very well-developed, well-nourished and well muscled    Skin   appropriate for sex and age    HEENT  unremarkable    Neck   no adenopathy, thyroid gland is small palpable    Breasts   normal male.  Well-developed pectoral muscles.  No gynecomastia    Heart  regular    Genitalia    normal phallus and testicles.  Right testicle larger than left but both are normal consistency and configuration    Extremities  no edema    Neuro  gait and station normal    Psych  appropriate, calm, pleasant    ASSESSMENT AND RECOMMENDATIONS    1. Testosterone deficiency ?            Positive overall sense of well-being response to testosterone supplementation without laboratory monitoring.  Blood testosterone levels were excessive at one-point.            Off testosterone for at least 3 months we will now establish a new baseline    - TESTOSTERONE,FREE ADULT MALE; Future  - ESTRADIOL; Future  - LUTEINIZING HORMONE SERUM; Future  - PROLACTIN; Future  - IGF-1 SOMATOMEDIN; Future    2. Polycythemia             Probably high-dose testosterone plus sleep apnea  - CBC WITHOUT DIFFERENTIAL; Future    3. DEJA (obstructive sleep apnea)             Presumably corrected with nasopharyngeal surgery      DISPOSITION: Discuss lab results by telephone.  Further testing based on      Luke Salinas M.D.    Copies to: Cortes Gomes M.D. [phone]  "

## 2019-03-16 ENCOUNTER — HOSPITAL ENCOUNTER (OUTPATIENT)
Dept: LAB | Facility: MEDICAL CENTER | Age: 29
End: 2019-03-16
Attending: INTERNAL MEDICINE
Payer: COMMERCIAL

## 2019-03-16 DIAGNOSIS — D75.1 POLYCYTHEMIA: ICD-10-CM

## 2019-03-16 DIAGNOSIS — E34.9 TESTOSTERONE DEFICIENCY: ICD-10-CM

## 2019-03-16 LAB
ERYTHROCYTE [DISTWIDTH] IN BLOOD BY AUTOMATED COUNT: 40.3 FL (ref 35.9–50)
ESTRADIOL SERPL-MCNC: 24 PG/ML
HCT VFR BLD AUTO: 50.8 % (ref 42–52)
HGB BLD-MCNC: 17.2 G/DL (ref 14–18)
LH SERPL-ACNC: 2 IU/L (ref 1.7–8.6)
MCH RBC QN AUTO: 31.7 PG (ref 27–33)
MCHC RBC AUTO-ENTMCNC: 33.9 G/DL (ref 33.7–35.3)
MCV RBC AUTO: 93.6 FL (ref 81.4–97.8)
PLATELET # BLD AUTO: 210 K/UL (ref 164–446)
PMV BLD AUTO: 10.6 FL (ref 9–12.9)
PROLACTIN SERPL-MCNC: 4.45 NG/ML (ref 2.1–17.7)
RBC # BLD AUTO: 5.43 M/UL (ref 4.7–6.1)
WBC # BLD AUTO: 5.1 K/UL (ref 4.8–10.8)

## 2019-03-16 PROCEDURE — 83002 ASSAY OF GONADOTROPIN (LH): CPT

## 2019-03-16 PROCEDURE — 84402 ASSAY OF FREE TESTOSTERONE: CPT

## 2019-03-16 PROCEDURE — 84305 ASSAY OF SOMATOMEDIN: CPT

## 2019-03-16 PROCEDURE — 84146 ASSAY OF PROLACTIN: CPT

## 2019-03-16 PROCEDURE — 36415 COLL VENOUS BLD VENIPUNCTURE: CPT

## 2019-03-16 PROCEDURE — 82670 ASSAY OF TOTAL ESTRADIOL: CPT

## 2019-03-16 PROCEDURE — 85027 COMPLETE CBC AUTOMATED: CPT

## 2019-03-18 LAB
IGF-I SERPL-MCNC: 164 NG/ML (ref 84–250)
IGF-I Z-SCORE SERPL: 0.1
TESTOST FREE SERPL-MCNC: 72 PG/ML (ref 47–244)

## 2019-03-22 ENCOUNTER — TELEPHONE (OUTPATIENT)
Dept: ENDOCRINOLOGY | Facility: MEDICAL CENTER | Age: 29
End: 2019-03-22

## 2019-03-22 NOTE — TELEPHONE ENCOUNTER
1. Caller Name:Ryland Reyes                                        Call Back Number: [phone]      Patient approves a detailed voicemail message: N\A    2. Patient is requesting lab results dated: 3/16/19    3. Confirmed results are in chart. Patient advised they will be contacted once interpreted by provider.

## 2019-03-22 NOTE — TELEPHONE ENCOUNTER
Alex called stating that he just missed Dr. Salinas's phone call. Patient would like a return phone call please.

## 2019-03-26 ENCOUNTER — TELEPHONE (OUTPATIENT)
Dept: ENDOCRINOLOGY | Facility: MEDICAL CENTER | Age: 29
End: 2019-03-26

## 2019-03-26 DIAGNOSIS — E23.0 HYPOGONADOTROPIC HYPOGONADISM IN MALE (HCC): ICD-10-CM

## 2019-03-27 NOTE — TELEPHONE ENCOUNTER
Telephone conversation with patient    Laboratory data reviewed.  CBC is stable showing no polycythemia    Free testosterone is low normal at 72 ().  LH a bit on the low side also 2.0 (1.7-9.6) this might suggest a pituitary or hypothalamic problem.  Prolactin is normal at 4.4 and IGF 1 also normal at 164.    He very definitely wants to boost his testosterone level.  When he was using testosterone he was another person and very impressed with the change.  I discussed testosterone supplementation and possible hypothalamic pituitary suppression which might have a carryover effect on fertility in later years.  He definitely wants to maintain his fertility because eventually he sees himself  and with a family.  This brings up the possibility of using Clomid.  This is commonly used these days but not FDA approved.  I do not know how good the long-term studies and safety profile for Clomid is    If we are going to consider Clomid we will have to do an MRI of his pituitary to make sure there is not a tumor.  That will be a contraindication.    I will do a pituitary MRI.  Assuming that is normal I might have a discussion with 1 of the fertility urology specialists or get a consultation.  He is in agreement.    Luke Salinas M.D.

## 2019-04-03 ENCOUNTER — HOSPITAL ENCOUNTER (OUTPATIENT)
Dept: RADIOLOGY | Facility: MEDICAL CENTER | Age: 29
End: 2019-04-03
Attending: INTERNAL MEDICINE
Payer: COMMERCIAL

## 2019-04-03 DIAGNOSIS — E23.0 HYPOGONADOTROPIC HYPOGONADISM IN MALE (HCC): ICD-10-CM

## 2019-04-03 PROCEDURE — 700117 HCHG RX CONTRAST REV CODE 255: Performed by: INTERNAL MEDICINE

## 2019-04-03 PROCEDURE — A9585 GADOBUTROL INJECTION: HCPCS | Performed by: INTERNAL MEDICINE

## 2019-04-03 PROCEDURE — 70553 MRI BRAIN STEM W/O & W/DYE: CPT

## 2019-04-03 RX ORDER — GADOBUTROL 604.72 MG/ML
10 INJECTION INTRAVENOUS ONCE
Status: COMPLETED | OUTPATIENT
Start: 2019-04-03 | End: 2019-04-03

## 2019-04-03 RX ADMIN — GADOBUTROL 10 ML: 604.72 INJECTION INTRAVENOUS at 15:45

## 2019-04-07 ENCOUNTER — TELEPHONE (OUTPATIENT)
Dept: ENDOCRINOLOGY | Facility: MEDICAL CENTER | Age: 29
End: 2019-04-07

## 2019-04-07 DIAGNOSIS — E23.0 HYPOGONADOTROPIC HYPOGONADISM SYNDROME, MALE (HCC): ICD-10-CM

## 2019-04-07 NOTE — TELEPHONE ENCOUNTER
Telephone conversation with patient    Pituitary MRI is discussed.  Measurement slightly small but not more than 2 standard deviations below the norm so I consider this not clinically significant and not a cause of his low LH.  Low LH might still stem from his previous use of testosterone even though it has been over 3 months since he has used.  Or the low LH may be be the cause of his relatively low testosterone production.    He would like to boost his testosterone level because when he did he got a very definite positive response.  However he wants to maintain fertility.  I am going to refer him to Dr. John Diggs who is expert in these matters for an opinion.  I am unclear on the potential consequences of long-term clomiphene for example.    Luke Salinas M.D.

## 2019-05-01 ENCOUNTER — HOSPITAL ENCOUNTER (OUTPATIENT)
Dept: LAB | Facility: MEDICAL CENTER | Age: 29
End: 2019-05-01
Attending: UROLOGY
Payer: COMMERCIAL

## 2019-05-01 LAB
FSH SERPL-ACNC: 4.7 MIU/ML (ref 1.5–12.4)
HCT VFR BLD AUTO: 53.4 % (ref 42–52)
LH SERPL-ACNC: 2 IU/L (ref 1.7–8.6)
TSH SERPL DL<=0.005 MIU/L-ACNC: 1.26 UIU/ML (ref 0.38–5.33)

## 2019-05-01 PROCEDURE — 83002 ASSAY OF GONADOTROPIN (LH): CPT

## 2019-05-01 PROCEDURE — 84270 ASSAY OF SEX HORMONE GLOBUL: CPT

## 2019-05-01 PROCEDURE — 84403 ASSAY OF TOTAL TESTOSTERONE: CPT

## 2019-05-01 PROCEDURE — 84443 ASSAY THYROID STIM HORMONE: CPT

## 2019-05-01 PROCEDURE — 85014 HEMATOCRIT: CPT

## 2019-05-01 PROCEDURE — 36415 COLL VENOUS BLD VENIPUNCTURE: CPT

## 2019-05-01 PROCEDURE — 83001 ASSAY OF GONADOTROPIN (FSH): CPT

## 2019-05-03 LAB
SHBG SERPL-SCNC: 47 NMOL/L (ref 11–80)
TESTOST FREE MFR SERPL: 1.6 % (ref 1.6–2.9)
TESTOST FREE SERPL-MCNC: 84 PG/ML (ref 47–244)
TESTOST SERPL-MCNC: 542 NG/DL (ref 300–1080)

## 2019-06-25 ENCOUNTER — HOSPITAL ENCOUNTER (OUTPATIENT)
Dept: LAB | Facility: MEDICAL CENTER | Age: 29
End: 2019-06-25
Attending: FAMILY MEDICINE
Payer: COMMERCIAL

## 2019-06-25 LAB
ALBUMIN SERPL BCP-MCNC: 4.5 G/DL (ref 3.2–4.9)
ALBUMIN/GLOB SERPL: 1.7 G/DL
ALP SERPL-CCNC: 80 U/L (ref 30–99)
ALT SERPL-CCNC: 16 U/L (ref 2–50)
ANION GAP SERPL CALC-SCNC: 8 MMOL/L (ref 0–11.9)
APPEARANCE UR: CLEAR
AST SERPL-CCNC: 12 U/L (ref 12–45)
BASOPHILS # BLD AUTO: 0.7 % (ref 0–1.8)
BASOPHILS # BLD: 0.05 K/UL (ref 0–0.12)
BILIRUB SERPL-MCNC: 0.7 MG/DL (ref 0.1–1.5)
BILIRUB UR QL STRIP.AUTO: NEGATIVE
BUN SERPL-MCNC: 17 MG/DL (ref 8–22)
C TRACH DNA SPEC QL NAA+PROBE: NEGATIVE
CALCIUM SERPL-MCNC: 9.7 MG/DL (ref 8.5–10.5)
CHLORIDE SERPL-SCNC: 102 MMOL/L (ref 96–112)
CHOLEST SERPL-MCNC: 195 MG/DL (ref 100–199)
CK SERPL-CCNC: 54 U/L (ref 0–154)
CO2 SERPL-SCNC: 27 MMOL/L (ref 20–33)
COLOR UR: YELLOW
CREAT SERPL-MCNC: 1 MG/DL (ref 0.5–1.4)
CRP SERPL HS-MCNC: 0.19 MG/DL (ref 0–0.75)
EOSINOPHIL # BLD AUTO: 0.11 K/UL (ref 0–0.51)
EOSINOPHIL NFR BLD: 1.5 % (ref 0–6.9)
ERYTHROCYTE [DISTWIDTH] IN BLOOD BY AUTOMATED COUNT: 41.4 FL (ref 35.9–50)
GLOBULIN SER CALC-MCNC: 2.6 G/DL (ref 1.9–3.5)
GLUCOSE SERPL-MCNC: 100 MG/DL (ref 65–99)
GLUCOSE UR STRIP.AUTO-MCNC: NEGATIVE MG/DL
HCT VFR BLD AUTO: 51.4 % (ref 42–52)
HCV AB SER QL: NEGATIVE
HDLC SERPL-MCNC: 49 MG/DL
HGB BLD-MCNC: 16.9 G/DL (ref 14–18)
HIV 1+2 AB+HIV1 P24 AG SERPL QL IA: NON REACTIVE
IMM GRANULOCYTES # BLD AUTO: 0.02 K/UL (ref 0–0.11)
IMM GRANULOCYTES NFR BLD AUTO: 0.3 % (ref 0–0.9)
KETONES UR STRIP.AUTO-MCNC: NEGATIVE MG/DL
LDLC SERPL CALC-MCNC: 128 MG/DL
LEUKOCYTE ESTERASE UR QL STRIP.AUTO: NEGATIVE
LYMPHOCYTES # BLD AUTO: 2.16 K/UL (ref 1–4.8)
LYMPHOCYTES NFR BLD: 30.4 % (ref 22–41)
MCH RBC QN AUTO: 30.5 PG (ref 27–33)
MCHC RBC AUTO-ENTMCNC: 32.9 G/DL (ref 33.7–35.3)
MCV RBC AUTO: 92.8 FL (ref 81.4–97.8)
MICRO URNS: NORMAL
MONOCYTES # BLD AUTO: 0.37 K/UL (ref 0–0.85)
MONOCYTES NFR BLD AUTO: 5.2 % (ref 0–13.4)
N GONORRHOEA DNA SPEC QL NAA+PROBE: NEGATIVE
NEUTROPHILS # BLD AUTO: 4.4 K/UL (ref 1.82–7.42)
NEUTROPHILS NFR BLD: 61.9 % (ref 44–72)
NITRITE UR QL STRIP.AUTO: NEGATIVE
NRBC # BLD AUTO: 0 K/UL
NRBC BLD-RTO: 0 /100 WBC
PH UR STRIP.AUTO: 5.5 [PH]
PLATELET # BLD AUTO: 231 K/UL (ref 164–446)
PMV BLD AUTO: 10.7 FL (ref 9–12.9)
POTASSIUM SERPL-SCNC: 4 MMOL/L (ref 3.6–5.5)
PROT SERPL-MCNC: 7.1 G/DL (ref 6–8.2)
PROT UR QL STRIP: NEGATIVE MG/DL
RBC # BLD AUTO: 5.54 M/UL (ref 4.7–6.1)
RBC UR QL AUTO: NEGATIVE
SODIUM SERPL-SCNC: 137 MMOL/L (ref 135–145)
SP GR UR STRIP.AUTO: 1.02
SPECIMEN SOURCE: NORMAL
TREPONEMA PALLIDUM IGG+IGM AB [PRESENCE] IN SERUM OR PLASMA BY IMMUNOASSAY: NON REACTIVE
TRIGL SERPL-MCNC: 91 MG/DL (ref 0–149)
TSH SERPL DL<=0.005 MIU/L-ACNC: 1.15 UIU/ML (ref 0.38–5.33)
UROBILINOGEN UR STRIP.AUTO-MCNC: 0.2 MG/DL
WBC # BLD AUTO: 7.1 K/UL (ref 4.8–10.8)

## 2019-06-25 PROCEDURE — 86803 HEPATITIS C AB TEST: CPT

## 2019-06-25 PROCEDURE — 80061 LIPID PANEL: CPT

## 2019-06-25 PROCEDURE — 86780 TREPONEMA PALLIDUM: CPT

## 2019-06-25 PROCEDURE — 82550 ASSAY OF CK (CPK): CPT

## 2019-06-25 PROCEDURE — 80053 COMPREHEN METABOLIC PANEL: CPT

## 2019-06-25 PROCEDURE — 36415 COLL VENOUS BLD VENIPUNCTURE: CPT

## 2019-06-25 PROCEDURE — 85025 COMPLETE CBC W/AUTO DIFF WBC: CPT

## 2019-06-25 PROCEDURE — 87491 CHLMYD TRACH DNA AMP PROBE: CPT

## 2019-06-25 PROCEDURE — 87389 HIV-1 AG W/HIV-1&-2 AB AG IA: CPT

## 2019-06-25 PROCEDURE — 86696 HERPES SIMPLEX TYPE 2 TEST: CPT

## 2019-06-25 PROCEDURE — 86695 HERPES SIMPLEX TYPE 1 TEST: CPT

## 2019-06-25 PROCEDURE — 81003 URINALYSIS AUTO W/O SCOPE: CPT

## 2019-06-25 PROCEDURE — 84443 ASSAY THYROID STIM HORMONE: CPT

## 2019-06-25 PROCEDURE — 86140 C-REACTIVE PROTEIN: CPT

## 2019-06-25 PROCEDURE — 87591 N.GONORRHOEAE DNA AMP PROB: CPT

## 2019-06-27 LAB
HSV1 GG IGG SER-ACNC: <0.01 IV
HSV2 GG IGG SER-ACNC: 0.1 IV

## 2019-10-05 ENCOUNTER — IMMUNIZATION (OUTPATIENT)
Dept: SOCIAL WORK | Facility: CLINIC | Age: 29
End: 2019-10-05
Payer: COMMERCIAL

## 2019-10-05 DIAGNOSIS — Z23 NEED FOR VACCINATION: ICD-10-CM

## 2019-10-05 PROCEDURE — 90471 IMMUNIZATION ADMIN: CPT | Performed by: REGISTERED NURSE

## 2019-10-05 PROCEDURE — 90686 IIV4 VACC NO PRSV 0.5 ML IM: CPT | Performed by: REGISTERED NURSE

## 2020-09-26 ENCOUNTER — IMMUNIZATION (OUTPATIENT)
Dept: SOCIAL WORK | Facility: CLINIC | Age: 30
End: 2020-09-26
Payer: COMMERCIAL

## 2020-09-26 DIAGNOSIS — Z23 NEED FOR VACCINATION: ICD-10-CM

## 2020-09-26 PROCEDURE — 90471 IMMUNIZATION ADMIN: CPT | Performed by: REGISTERED NURSE

## 2020-09-26 PROCEDURE — 90686 IIV4 VACC NO PRSV 0.5 ML IM: CPT | Performed by: REGISTERED NURSE

## 2021-03-16 ENCOUNTER — HOSPITAL ENCOUNTER (OUTPATIENT)
Dept: LAB | Facility: MEDICAL CENTER | Age: 31
End: 2021-03-16
Attending: OPTOMETRIST
Payer: COMMERCIAL

## 2021-03-16 LAB
COVID ORDER STATUS COVID19: NORMAL
SARS-COV-2 RNA RESP QL NAA+PROBE: NOTDETECTED
SPECIMEN SOURCE: NORMAL

## 2021-03-16 PROCEDURE — U0003 INFECTIOUS AGENT DETECTION BY NUCLEIC ACID (DNA OR RNA); SEVERE ACUTE RESPIRATORY SYNDROME CORONAVIRUS 2 (SARS-COV-2) (CORONAVIRUS DISEASE [COVID-19]), AMPLIFIED PROBE TECHNIQUE, MAKING USE OF HIGH THROUGHPUT TECHNOLOGIES AS DESCRIBED BY CMS-2020-01-R: HCPCS

## 2021-03-16 PROCEDURE — U0005 INFEC AGEN DETEC AMPLI PROBE: HCPCS

## 2021-03-16 PROCEDURE — C9803 HOPD COVID-19 SPEC COLLECT: HCPCS

## 2021-03-24 ENCOUNTER — HOSPITAL ENCOUNTER (OUTPATIENT)
Dept: LAB | Facility: MEDICAL CENTER | Age: 31
End: 2021-03-24
Attending: OPTOMETRIST
Payer: COMMERCIAL

## 2021-03-24 PROCEDURE — U0005 INFEC AGEN DETEC AMPLI PROBE: HCPCS

## 2021-03-24 PROCEDURE — C9803 HOPD COVID-19 SPEC COLLECT: HCPCS

## 2021-03-24 PROCEDURE — U0003 INFECTIOUS AGENT DETECTION BY NUCLEIC ACID (DNA OR RNA); SEVERE ACUTE RESPIRATORY SYNDROME CORONAVIRUS 2 (SARS-COV-2) (CORONAVIRUS DISEASE [COVID-19]), AMPLIFIED PROBE TECHNIQUE, MAKING USE OF HIGH THROUGHPUT TECHNOLOGIES AS DESCRIBED BY CMS-2020-01-R: HCPCS

## 2021-04-27 ENCOUNTER — HOSPITAL ENCOUNTER (OUTPATIENT)
Facility: MEDICAL CENTER | Age: 31
End: 2021-04-27
Attending: NURSE PRACTITIONER
Payer: COMMERCIAL

## 2021-04-27 ENCOUNTER — OFFICE VISIT (OUTPATIENT)
Dept: URGENT CARE | Facility: CLINIC | Age: 31
End: 2021-04-27
Payer: COMMERCIAL

## 2021-04-27 VITALS
DIASTOLIC BLOOD PRESSURE: 70 MMHG | HEART RATE: 96 BPM | OXYGEN SATURATION: 98 % | TEMPERATURE: 97.9 F | RESPIRATION RATE: 18 BRPM | SYSTOLIC BLOOD PRESSURE: 118 MMHG

## 2021-04-27 DIAGNOSIS — Z20.822 SUSPECTED COVID-19 VIRUS INFECTION: ICD-10-CM

## 2021-04-27 DIAGNOSIS — J02.9 EXUDATIVE PHARYNGITIS: ICD-10-CM

## 2021-04-27 DIAGNOSIS — J02.9 PHARYNGITIS, UNSPECIFIED ETIOLOGY: ICD-10-CM

## 2021-04-27 LAB
COVID ORDER STATUS COVID19: NORMAL
INT CON NEG: NORMAL
INT CON POS: NORMAL
S PYO AG THROAT QL: NEGATIVE
SARS-COV-2 RNA RESP QL NAA+PROBE: NOTDETECTED
SPECIMEN SOURCE: NORMAL

## 2021-04-27 PROCEDURE — 99204 OFFICE O/P NEW MOD 45 MIN: CPT | Performed by: NURSE PRACTITIONER

## 2021-04-27 PROCEDURE — U0003 INFECTIOUS AGENT DETECTION BY NUCLEIC ACID (DNA OR RNA); SEVERE ACUTE RESPIRATORY SYNDROME CORONAVIRUS 2 (SARS-COV-2) (CORONAVIRUS DISEASE [COVID-19]), AMPLIFIED PROBE TECHNIQUE, MAKING USE OF HIGH THROUGHPUT TECHNOLOGIES AS DESCRIBED BY CMS-2020-01-R: HCPCS

## 2021-04-27 PROCEDURE — U0005 INFEC AGEN DETEC AMPLI PROBE: HCPCS

## 2021-04-27 PROCEDURE — 87880 STREP A ASSAY W/OPTIC: CPT | Performed by: NURSE PRACTITIONER

## 2021-04-27 RX ORDER — AMOXICILLIN 500 MG/1
500 CAPSULE ORAL 2 TIMES DAILY
Qty: 20 CAPSULE | Refills: 0 | Status: SHIPPED | OUTPATIENT
Start: 2021-04-27 | End: 2021-05-07

## 2021-04-27 RX ORDER — DIPHENHYDRAMINE HYDROCHLORIDE AND LIDOCAINE HYDROCHLORIDE AND ALUMINUM HYDROXIDE AND MAGNESIUM HYDRO
5 KIT EVERY 6 HOURS PRN
Qty: 237 ML | Refills: 0 | Status: SHIPPED | OUTPATIENT
Start: 2021-04-27 | End: 2022-01-04

## 2021-04-27 ASSESSMENT — ENCOUNTER SYMPTOMS
HEARTBURN: 0
FEVER: 1
COUGH: 0
SORE THROAT: 1
WHEEZING: 0
CHILLS: 1
ABDOMINAL PAIN: 0
SHORTNESS OF BREATH: 0
SPUTUM PRODUCTION: 0
SINUS PAIN: 0
DIZZINESS: 0
VOMITING: 0
NAUSEA: 0

## 2021-04-27 NOTE — PROGRESS NOTES
Subjective:   Ryland Reyes is a 31 y.o. male who presents for Sore Throat (x1wk, sore throat, ear pain mostly right side, fever, fatigue)      HPI  31-year-old male patient in urgent care for fever TMAX 103, severe sore throat and fatigue.  He denies shortness of breath, wheezing, difficulty breathing but does state that it hurts quite a bit to swallow.  Patient did have his Pfizer vaccine about 5 days ago had 1 bad day and felt great for 24 hours and then started feeling sick again.  He has not taken anything over-the-counter for symptoms.    Review of Systems   Constitutional: Positive for chills, fever and malaise/fatigue.   HENT: Positive for sore throat. Negative for congestion and sinus pain.    Respiratory: Negative for cough, sputum production, shortness of breath and wheezing.    Gastrointestinal: Negative for abdominal pain, heartburn, nausea and vomiting.   Skin: Negative for rash.   Neurological: Negative for dizziness.   All other systems reviewed and are negative.      Patient Active Problem List   Diagnosis   • DEJA (obstructive sleep apnea)   • Nasal septal deviation   • Hypertrophy of both inferior nasal turbinates   • Tonsillar hypertrophy   • Hypogonadotropic hypogonadism in male (HCC)     Past Surgical History:   Procedure Laterality Date   • SEPTOPLASTY N/A 6/21/2018    Procedure: SEPTOPLASTY;  Surgeon: Cortes Hi M.D.;  Location: SURGERY SAME DAY [facility];  Service: Ent   • TURBINOPLASTY Bilateral 6/21/2018    Procedure: TURBINOPLASTY;  Surgeon: Cortes Hi M.D.;  Location: SURGERY SAME DAY [facility];  Service: Ent   • UVULOPHARYNGOPALATOPLASTY N/A 6/21/2018    Procedure: UVULOPHARYNGOPALATOPLASTY;  Surgeon: Cortes Hi M.D.;  Location: SURGERY SAME DAY [facility];  Service: Ent   • OTHER  2007    wisdom teeth removed     Social History     Tobacco Use   • Smoking status: Never Smoker   • Smokeless tobacco: Never Used   • Tobacco comment: weekday 2-3 weekend 3-4     Substance Use Topics   • Alcohol use: Yes     Comment: 4-5 drinks a week   • Drug use: Yes     Frequency: 7.0 times per week     Types: Marijuana     Comment: marijuana vap pen      Family History   Problem Relation Age of Onset   • No Known Problems Mother    • Sleep Apnea Father         Severe DEJA, had UPPP   • No Known Problems Sister       (Allergies, Medications, & Tobacco/Substance Use were reconciled by the Medical Assistant and reviewed by myself. The family history is prepopulated)     Objective:     /70 (BP Location: Left arm, Patient Position: Sitting, BP Cuff Size: Adult)   Pulse 96   Temp 36.6 °C (97.9 °F) (Temporal)   Resp 18   SpO2 98%     Physical Exam  Vitals reviewed.   Constitutional:       Appearance: Normal appearance.   HENT:      Right Ear: Tympanic membrane, ear canal and external ear normal.      Left Ear: Tympanic membrane, ear canal and external ear normal.      Nose: Nose normal.      Mouth/Throat:      Lips: Pink.      Mouth: Mucous membranes are moist.      Pharynx: Uvula midline. Posterior oropharyngeal erythema present.      Tonsils: 0 on the right. 0 on the left.     Cardiovascular:      Rate and Rhythm: Normal rate and regular rhythm.      Heart sounds: Normal heart sounds.   Pulmonary:      Effort: Pulmonary effort is normal.      Breath sounds: Normal breath sounds.   Lymphadenopathy:      Cervical: Cervical adenopathy present.   Skin:     General: Skin is warm.   Neurological:      Mental Status: He is alert and oriented to person, place, and time.   Psychiatric:         Mood and Affect: Mood normal.         Behavior: Behavior normal.         Thought Content: Thought content normal.         Judgment: Judgment normal.         Assessment/Plan:     1. Pharyngitis, unspecified etiology  POCT Rapid Strep A    DPH-Lido-AlHydr-MgHydr-Simeth (MAGIC MOUTHWASH BLM) Suspension   2. Suspected COVID-19 virus infection  COVID/SARS CoV-2 PCR   3. Exudative pharyngitis  amoxicillin  (AMOXIL) 500 MG Cap       Discussed physical examination findings as well as patient presentation, length patient symptoms and negative strep in clinic will be treated with antibiotics due to patient presentation.  Advised to finish all antibiotics as prescribed.  Discussed supportive care including over-the-counter NSAIDs and Tylenol per 's instructions, throat lozenges, throat sprays and salt water gargles.  Advised to switch out toothbrush in 48 hours.    Will also send magic mouthwash for patient     Discussed Physical examination findings with patient could also be viral in etiology and could be due to COVID so will perform testing. Advised patient to remain in self isolation until cleared by a negative test or the health department, if they test positive. If exposed to COVID, advised to stay home for 10 days post exposure due to the possibility of developing symptoms day 2-10 post exposure. Will release results to [facility]. Strict ER precautions provided for worsening symptoms including SOB, difficulty breathing or high fever unable to be controlled by OTC tylenol or NSAIDS. Viral illness are largely self limiting and patient should increase fluids using electrolyte enriched beverages as well as OTC medications such as tylenol and ibuprofen per 's instructions.      Appropriate PPE worn at all times by provider. Patient had face mask on for entirety of visit other than during oropharyngeal examination    Differential diagnosis, natural history, supportive care, and indications for immediate follow-up discussed.    Advised the patient to follow-up with the primary care physician for recheck, reevaluation, and consideration of further management.    Please note that this dictation was created using voice recognition software. I have made a reasonable attempt to correct obvious errors, but I expect that there are errors of grammar and possibly content that I did not discover before  finalizing the note.    This note was electronically signed MASHA Feliciano

## 2022-01-04 ENCOUNTER — HOSPITAL ENCOUNTER (OUTPATIENT)
Facility: MEDICAL CENTER | Age: 32
End: 2022-01-04
Attending: NURSE PRACTITIONER
Payer: COMMERCIAL

## 2022-01-04 ENCOUNTER — OFFICE VISIT (OUTPATIENT)
Dept: URGENT CARE | Facility: PHYSICIAN GROUP | Age: 32
End: 2022-01-04
Payer: COMMERCIAL

## 2022-01-04 VITALS
WEIGHT: 180 LBS | HEIGHT: 70 IN | OXYGEN SATURATION: 95 % | DIASTOLIC BLOOD PRESSURE: 84 MMHG | BODY MASS INDEX: 25.77 KG/M2 | SYSTOLIC BLOOD PRESSURE: 130 MMHG | HEART RATE: 102 BPM | TEMPERATURE: 98.4 F

## 2022-01-04 DIAGNOSIS — J02.9 SORE THROAT: ICD-10-CM

## 2022-01-04 DIAGNOSIS — J06.9 UPPER RESPIRATORY INFECTION, ACUTE: Primary | ICD-10-CM

## 2022-01-04 DIAGNOSIS — R52 BODY ACHES: ICD-10-CM

## 2022-01-04 DIAGNOSIS — R05.9 COUGH: ICD-10-CM

## 2022-01-04 DIAGNOSIS — R09.89 CHEST CONGESTION: ICD-10-CM

## 2022-01-04 LAB
INT CON NEG: NEGATIVE
INT CON POS: NORMAL
S PYO AG THROAT QL: NORMAL

## 2022-01-04 PROCEDURE — 87880 STREP A ASSAY W/OPTIC: CPT | Performed by: NURSE PRACTITIONER

## 2022-01-04 PROCEDURE — U0005 INFEC AGEN DETEC AMPLI PROBE: HCPCS

## 2022-01-04 PROCEDURE — U0003 INFECTIOUS AGENT DETECTION BY NUCLEIC ACID (DNA OR RNA); SEVERE ACUTE RESPIRATORY SYNDROME CORONAVIRUS 2 (SARS-COV-2) (CORONAVIRUS DISEASE [COVID-19]), AMPLIFIED PROBE TECHNIQUE, MAKING USE OF HIGH THROUGHPUT TECHNOLOGIES AS DESCRIBED BY CMS-2020-01-R: HCPCS

## 2022-01-04 PROCEDURE — 99214 OFFICE O/P EST MOD 30 MIN: CPT | Performed by: NURSE PRACTITIONER

## 2022-01-04 ASSESSMENT — LIFESTYLE VARIABLES: SUBSTANCE_ABUSE: 0

## 2022-01-04 ASSESSMENT — ENCOUNTER SYMPTOMS
NECK PAIN: 0
SPUTUM PRODUCTION: 0
SHORTNESS OF BREATH: 0
MYALGIAS: 1
CHILLS: 0
COUGH: 1
HEADACHES: 1
SORE THROAT: 1
NAUSEA: 0
DIZZINESS: 0
FEVER: 0

## 2022-01-04 NOTE — PROGRESS NOTES
Ryland Reyes is a 31 y.o. male who presents for Sore Throat (cough, congestion, body aches, sore throat x 2 days)      HPI this new problem.  Ryland is a 31-year-old male patient presents with a sore throat, cough, congestion, body aches and headache for 2 days.  His symptoms have slowly worsened.  tx tried: vit C, eccinacia, dayquil, nyquil, theraflu, increased fluids, rest. - Helped a little.   No exposures to strep throat or Covid.     Review of Systems   Constitutional: Negative for chills, fever and malaise/fatigue.   HENT: Positive for congestion and sore throat. Negative for ear pain.    Respiratory: Positive for cough. Negative for sputum production and shortness of breath.    Cardiovascular: Negative for chest pain.   Gastrointestinal: Negative for nausea.   Genitourinary: Negative for dysuria.   Musculoskeletal: Positive for myalgias. Negative for neck pain.   Neurological: Positive for headaches. Negative for dizziness.   Endo/Heme/Allergies: Negative for environmental allergies.   Psychiatric/Behavioral: Negative for substance abuse.   All other systems reviewed and are negative.      No Known Allergies  Past Medical History:   Diagnosis Date   • Bronchitis 2017   • Cough    • Pneumonia 2008   • Shortness of breath    • Sleep apnea    • Snoring      Past Surgical History:   Procedure Laterality Date   • SEPTOPLASTY N/A 6/21/2018    Procedure: SEPTOPLASTY;  Surgeon: Cortes Hi M.D.;  Location: SURGERY SAME DAY [facility];  Service: Ent   • TURBINOPLASTY Bilateral 6/21/2018    Procedure: TURBINOPLASTY;  Surgeon: Cortes Hi M.D.;  Location: SURGERY SAME DAY [facility];  Service: Ent   • UVULOPHARYNGOPALATOPLASTY N/A 6/21/2018    Procedure: UVULOPHARYNGOPALATOPLASTY;  Surgeon: Cortes Hi M.D.;  Location: SURGERY SAME DAY [facility];  Service: Ent   • OTHER  2007    wisdom teeth removed     Family History   Problem Relation Age of Onset   • No Known Problems Mother    • Sleep Apnea  "Father         Severe DEJA, had UPPP   • No Known Problems Sister      Social History     Tobacco Use   • Smoking status: Never Smoker   • Smokeless tobacco: Never Used   • Tobacco comment: weekday 2-3 weekend 3-4    Substance Use Topics   • Alcohol use: Yes     Comment: 4-5 drinks a week     Patient Active Problem List   Diagnosis   • DEJA (obstructive sleep apnea)   • Nasal septal deviation   • Hypertrophy of both inferior nasal turbinates   • Tonsillar hypertrophy   • Hypogonadotropic hypogonadism in male (HCC)     Current Outpatient Medications on File Prior to Visit   Medication Sig Dispense Refill   • busPIRone (BUSPAR) 10 MG Tab tablet        No current facility-administered medications on file prior to visit.          Objective:     /84   Pulse (!) 102   Temp 36.9 °C (98.4 °F)   Ht 1.778 m (5' 10\")   Wt 81.6 kg (180 lb)   SpO2 95%   BMI 25.83 kg/m²     Physical Exam  Vitals and nursing note reviewed.   Constitutional:       General: He is not in acute distress.     Appearance: Normal appearance. He is well-developed and normal weight. He is not ill-appearing or toxic-appearing.   HENT:      Head: Normocephalic.      Right Ear: Hearing, tympanic membrane, ear canal and external ear normal.      Left Ear: Hearing, tympanic membrane, ear canal and external ear normal.      Nose: Mucosal edema and rhinorrhea present.      Right Sinus: No maxillary sinus tenderness or frontal sinus tenderness.      Left Sinus: No maxillary sinus tenderness or frontal sinus tenderness.      Mouth/Throat:      Mouth: Mucous membranes are moist.      Pharynx: Uvula midline. Posterior oropharyngeal erythema present. No oropharyngeal exudate or uvula swelling.      Tonsils: No tonsillar abscesses.   Eyes:      General: Lids are normal.      Conjunctiva/sclera: Conjunctivae normal.   Neck:      Trachea: Trachea and phonation normal.   Cardiovascular:      Rate and Rhythm: Normal rate and regular rhythm.      Pulses: Normal " pulses.      Heart sounds: Normal heart sounds.   Pulmonary:      Effort: Pulmonary effort is normal.      Breath sounds: Normal breath sounds.   Chest:   Breasts:      Right: No supraclavicular adenopathy.      Left: No supraclavicular adenopathy.       Musculoskeletal:         General: Normal range of motion.      Cervical back: Full passive range of motion without pain, normal range of motion and neck supple. No muscular tenderness. Normal range of motion.   Lymphadenopathy:      Cervical: No cervical adenopathy.      Upper Body:      Right upper body: No supraclavicular adenopathy.      Left upper body: No supraclavicular adenopathy.   Skin:     General: Skin is warm and dry.      Capillary Refill: Capillary refill takes less than 2 seconds.   Neurological:      Mental Status: He is alert and oriented to person, place, and time.   Psychiatric:         Mood and Affect: Mood normal.         Behavior: Behavior normal. Behavior is cooperative.         Thought Content: Thought content normal.         Judgment: Judgment normal.       Results for orders placed or performed in visit on 01/04/22   POCT Rapid Strep A   Result Value Ref Range    Rapid Strep Screen Neg     Internal Control Positive Pass     Internal Control Negative Negative        Assessment /Associated Orders:      1. Upper respiratory infection, acute     2. Sore throat  POCT Rapid Strep A    SARS-CoV-2 PCR (24 hour In-House): Collect NP swab in VTM   3. Cough  SARS-CoV-2 PCR (24 hour In-House): Collect NP swab in VTM   4. Chest congestion  SARS-CoV-2 PCR (24 hour In-House): Collect NP swab in VTM   5. Body aches  SARS-CoV-2 PCR (24 hour In-House): Collect NP swab in VTM         Medical Decision Making:    Pt is clinically stable at today's acute urgent care visit.  No acute distress noted. Appropriate for outpatient management at this time.   Rapid strep: Negative  COVID PCR testing - pending- (results to be released to [facility] if available)    Quarantine per CDC guidelines   Educated in infection control practices.   Salt water gargles BID and prn. Suggested 1/4 to 1/2 teaspoon (1.5 to 3.0 g) of salt per one cup (8 ounces or 250 mL) of warm water.   OTC throat analgesic spray or lozenge of choice prn throat pain. Dosage and directions per   OTC  analgesic/ antipyretic of choice ( acetaminophen or NSAID). Follow manufactures dosing and safety precautions.   OTC medications for symptomatic relief of symptoms'  Humidifier at night prn could be helpful to reduce sx.   Keep well hydrated  Increase rest    Advised to follow-up with the primary care provider  for recheck, reevaluation, and consideration of further management if necessary.   Discussed management options (risks,benefits, and alternatives to treatment). Pt/ caregiver expressed understanding and the treatment plan was agreed upon. Questions were encouraged and answered     Return to urgent care prn if new or worsening sx or if there is no improvement in condition prn . Red flags discussed and indications to immediately call 911 or present to the Emergency Department.     I personally reviewed prior external notes and test results pertinent to today's visit.  I have independently reviewed and interpreted all diagnostics ordered during this urgent care acute visit.   Time spent evaluating this patient was at least 30 minutes and includes preparing for visit, counseling/education, exam and evaluation, obtaining history, independent interpretation, ordering lab/test/procedures,medication management and documentation.This does not include time spent on separate billable procedures.           Please note that this dictation was created using voice recognition software. I have made a reasonable attempt to correct obvious errors, but I expect that there are errors of grammar and possibly content that I did not discover before finalizing the note.    This note was electronically signed by  Mayi Boudreaux APRN, Urgent Care

## 2022-01-05 DIAGNOSIS — R52 BODY ACHES: ICD-10-CM

## 2022-01-05 DIAGNOSIS — R05.9 COUGH: ICD-10-CM

## 2022-01-05 DIAGNOSIS — J02.9 SORE THROAT: ICD-10-CM

## 2022-01-05 DIAGNOSIS — R09.89 CHEST CONGESTION: ICD-10-CM

## 2022-01-06 ENCOUNTER — PATIENT MESSAGE (OUTPATIENT)
Dept: URGENT CARE | Facility: PHYSICIAN GROUP | Age: 32
End: 2022-01-06

## 2022-01-07 NOTE — TELEPHONE ENCOUNTER
From: Ryland Jasmine Law  To: Nurse Practitioner Mayi Boudreaux  Sent: 1/6/2022 9:02 AM PST  Subject: COVID-19 Test Results    Do you know when my COVID-19 test results will be available? I took a test on 1/4. My strep test results posted on [employer], but I do not see any updates on my   COVID test. Thanks

## (undated) DEVICE — CATHETER IV 20 GA X 1-1/4 ---SURG.& SDS ONLY--- (50EA/BX)

## (undated) DEVICE — LACTATED RINGERS INJ 1000 ML - (14EA/CA 60CA/PF)

## (undated) DEVICE — NEPTUNE 4 PORT MANIFOLD - (20/PK)

## (undated) DEVICE — SET LEADWIRE 5 LEAD BEDSIDE DISPOSABLE ECG (1SET OF 5/EA)

## (undated) DEVICE — GLOVE BIOGEL SZ 8 SURGICAL PF LTX - (50PR/BX 4BX/CA)

## (undated) DEVICE — KIT  I.V. START (100EA/CA)

## (undated) DEVICE — PATTIES SURG X-RAYCOTTONOID - 1/2 X 3 IN (200/CA)

## (undated) DEVICE — TUBING CLEARLINK DUO-VENT - C-FLO (48EA/CA)

## (undated) DEVICE — SUTURE 2-0 VICRYL PLUS SH - 8 X 18 INCH (12/BX)

## (undated) DEVICE — KIT SURGIFLO W/OUT THROMBIN - (6EA/CA)

## (undated) DEVICE — WATER IRRIGATION STERILE 1000ML (12EA/CA)

## (undated) DEVICE — GOWN WARMING STANDARD FLEX - (30/CA)

## (undated) DEVICE — CANISTER SUCTION RIGID RED 1500CC (40EA/CA)

## (undated) DEVICE — TUBE CONNECTING SUCTION - CLEAR PLASTIC STERILE 72 IN (50EA/CA)

## (undated) DEVICE — GLOVE SZ 7.5 BIOGEL PI MICRO - PF LF (50PR/BX)

## (undated) DEVICE — ELECTRODE 850 FOAM ADHESIVE - HYDROGEL RADIOTRNSPRNT (50/PK)

## (undated) DEVICE — CIRCUIT VENTILATOR PEDIATRIC WITH FILTER  (20EA/CS)

## (undated) DEVICE — CANISTER SUCTION 3000ML MECHANICAL FILTER AUTO SHUTOFF MEDI-VAC NONSTERILE LF DISP  (40EA/CA)

## (undated) DEVICE — SPONGE TONSIL MEDIUM XRAY STERILE 1 - (5/PK 20PK/CA)"

## (undated) DEVICE — Device

## (undated) DEVICE — PACK ENT OR - (2EA/CA)

## (undated) DEVICE — SUTURE GENERAL

## (undated) DEVICE — 6-0 ETHILON CLEAR P-1 - (12/BX)

## (undated) DEVICE — KIT ANESTHESIA W/CIRCUIT & 3/LT BAG W/FILTER (20EA/CA)

## (undated) DEVICE — ANTI-FOG SOLUTION - 60BTL/CA

## (undated) DEVICE — SUTURE 4-0 CHROMIC GUT - 18 INCH G-3 D/A (12/BX)

## (undated) DEVICE — BOVIE FOOT CONTROL SUCTION - 6IN 10FR (25EA/CA)

## (undated) DEVICE — SUCTION INSTRUMENT YANKAUER BULBOUS TIP W/O VENT (50EA/CA)

## (undated) DEVICE — SENSOR SPO2 NEO LNCS ADHESIVE (20/BX) SEE USER NOTES

## (undated) DEVICE — PROTECTOR ULNA NERVE - (36PR/CA)

## (undated) DEVICE — SUTURE 3-0 SILK FS 18 INCH - (12PK/BX)

## (undated) DEVICE — SODIUM CHL IRRIGATION 0.9% 1000ML (12EA/CA)

## (undated) DEVICE — HEAD HOLDER JUNIOR/ADULT

## (undated) DEVICE — SUTURE 3-0 ETHILON FS-1 - (36/BX) 30 INCH

## (undated) DEVICE — SYRINGE 10 ML CONTROL LL (25EA/BX 4BX/CA)

## (undated) DEVICE — ELECTRODE DUAL RETURN W/ CORD - (50/PK)

## (undated) DEVICE — MASK ANESTHESIA ADULT  - (100/CA)